# Patient Record
Sex: FEMALE | NOT HISPANIC OR LATINO | Employment: FULL TIME | URBAN - METROPOLITAN AREA
[De-identification: names, ages, dates, MRNs, and addresses within clinical notes are randomized per-mention and may not be internally consistent; named-entity substitution may affect disease eponyms.]

---

## 2021-01-27 ENCOUNTER — IMMUNIZATION (OUTPATIENT)
Dept: NURSING | Facility: CLINIC | Age: 26
End: 2021-01-27
Payer: COMMERCIAL

## 2021-01-27 PROCEDURE — 91300 PR COVID VAC PFIZER DIL RECON 30 MCG/0.3 ML IM: CPT

## 2021-01-27 PROCEDURE — 0001A PR COVID VAC PFIZER DIL RECON 30 MCG/0.3 ML IM: CPT

## 2021-02-17 ENCOUNTER — IMMUNIZATION (OUTPATIENT)
Dept: NURSING | Facility: CLINIC | Age: 26
End: 2021-02-17
Attending: FAMILY MEDICINE
Payer: COMMERCIAL

## 2021-02-17 PROCEDURE — 91300 PR COVID VAC PFIZER DIL RECON 30 MCG/0.3 ML IM: CPT

## 2021-02-17 PROCEDURE — 0002A PR COVID VAC PFIZER DIL RECON 30 MCG/0.3 ML IM: CPT

## 2021-03-07 ENCOUNTER — HEALTH MAINTENANCE LETTER (OUTPATIENT)
Age: 26
End: 2021-03-07

## 2021-10-11 ENCOUNTER — HEALTH MAINTENANCE LETTER (OUTPATIENT)
Age: 26
End: 2021-10-11

## 2022-03-27 ENCOUNTER — HEALTH MAINTENANCE LETTER (OUTPATIENT)
Age: 27
End: 2022-03-27

## 2022-09-25 ENCOUNTER — HEALTH MAINTENANCE LETTER (OUTPATIENT)
Age: 27
End: 2022-09-25

## 2022-12-28 ENCOUNTER — HOSPITAL ENCOUNTER (INPATIENT)
Facility: CLINIC | Age: 27
LOS: 3 days | Discharge: HOME | End: 2023-01-01
Attending: EMERGENCY MEDICINE | Admitting: HOSPITALIST
Payer: COMMERCIAL

## 2022-12-28 DIAGNOSIS — R11.10 VOMITING, UNSPECIFIED VOMITING TYPE, UNSPECIFIED WHETHER NAUSEA PRESENT: ICD-10-CM

## 2022-12-28 DIAGNOSIS — R11.0 NAUSEA: ICD-10-CM

## 2022-12-28 DIAGNOSIS — N10 PYELONEPHRITIS, ACUTE: Primary | ICD-10-CM

## 2022-12-28 DIAGNOSIS — R93.5 ABNORMAL CT OF THE ABDOMEN: ICD-10-CM

## 2022-12-28 DIAGNOSIS — R10.9 ACUTE ABDOMINAL PAIN: ICD-10-CM

## 2022-12-28 LAB
ALBUMIN SERPL-MCNC: 4 G/DL (ref 3.4–5)
ALBUMIN UR-MCNC: NEGATIVE MG/DL
ALP SERPL-CCNC: 65 U/L (ref 40–150)
ALT SERPL W P-5'-P-CCNC: 19 U/L (ref 0–50)
ANION GAP SERPL CALCULATED.3IONS-SCNC: 9 MMOL/L (ref 3–14)
APPEARANCE UR: CLEAR
AST SERPL W P-5'-P-CCNC: 16 U/L (ref 0–45)
BASOPHILS # BLD AUTO: 0 10E3/UL (ref 0–0.2)
BASOPHILS NFR BLD AUTO: 0 %
BILIRUB SERPL-MCNC: 1.3 MG/DL (ref 0.2–1.3)
BILIRUB UR QL STRIP: NEGATIVE
BUN SERPL-MCNC: 10 MG/DL (ref 7–30)
CALCIUM SERPL-MCNC: 9.4 MG/DL (ref 8.5–10.1)
CHLORIDE BLD-SCNC: 99 MMOL/L (ref 94–109)
CO2 SERPL-SCNC: 22 MMOL/L (ref 20–32)
COLOR UR AUTO: ABNORMAL
CREAT SERPL-MCNC: 0.66 MG/DL (ref 0.52–1.04)
EOSINOPHIL # BLD AUTO: 0 10E3/UL (ref 0–0.7)
EOSINOPHIL NFR BLD AUTO: 0 %
ERYTHROCYTE [DISTWIDTH] IN BLOOD BY AUTOMATED COUNT: 11.5 % (ref 10–15)
GFR SERPL CREATININE-BSD FRML MDRD: >90 ML/MIN/1.73M2
GLUCOSE BLD-MCNC: 100 MG/DL (ref 70–99)
GLUCOSE UR STRIP-MCNC: NEGATIVE MG/DL
HCG SERPL QL: NEGATIVE
HCT VFR BLD AUTO: 38.3 % (ref 35–47)
HGB BLD-MCNC: 13.1 G/DL (ref 11.7–15.7)
HGB UR QL STRIP: NEGATIVE
IMM GRANULOCYTES # BLD: 0.1 10E3/UL
IMM GRANULOCYTES NFR BLD: 1 %
KETONES UR STRIP-MCNC: 60 MG/DL
LACTATE SERPL-SCNC: 1.1 MMOL/L (ref 0.7–2)
LEUKOCYTE ESTERASE UR QL STRIP: NEGATIVE
LIPASE SERPL-CCNC: 77 U/L (ref 73–393)
LYMPHOCYTES # BLD AUTO: 1.2 10E3/UL (ref 0.8–5.3)
LYMPHOCYTES NFR BLD AUTO: 7 %
MCH RBC QN AUTO: 31.6 PG (ref 26.5–33)
MCHC RBC AUTO-ENTMCNC: 34.2 G/DL (ref 31.5–36.5)
MCV RBC AUTO: 93 FL (ref 78–100)
MONOCYTES # BLD AUTO: 1.5 10E3/UL (ref 0–1.3)
MONOCYTES NFR BLD AUTO: 9 %
NEUTROPHILS # BLD AUTO: 14.8 10E3/UL (ref 1.6–8.3)
NEUTROPHILS NFR BLD AUTO: 83 %
NITRATE UR QL: NEGATIVE
NRBC # BLD AUTO: 0 10E3/UL
NRBC BLD AUTO-RTO: 0 /100
PH UR STRIP: 7 [PH] (ref 5–7)
PLATELET # BLD AUTO: 248 10E3/UL (ref 150–450)
POTASSIUM BLD-SCNC: 3.4 MMOL/L (ref 3.4–5.3)
PROT SERPL-MCNC: 7.7 G/DL (ref 6.8–8.8)
RBC # BLD AUTO: 4.14 10E6/UL (ref 3.8–5.2)
SODIUM SERPL-SCNC: 130 MMOL/L (ref 133–144)
SP GR UR STRIP: 1.01 (ref 1–1.03)
UROBILINOGEN UR STRIP-MCNC: NORMAL MG/DL
WBC # BLD AUTO: 17.7 10E3/UL (ref 4–11)

## 2022-12-28 PROCEDURE — C9803 HOPD COVID-19 SPEC COLLECT: HCPCS

## 2022-12-28 PROCEDURE — 80053 COMPREHEN METABOLIC PANEL: CPT | Performed by: EMERGENCY MEDICINE

## 2022-12-28 PROCEDURE — 84145 PROCALCITONIN (PCT): CPT | Performed by: HOSPITALIST

## 2022-12-28 PROCEDURE — 96361 HYDRATE IV INFUSION ADD-ON: CPT

## 2022-12-28 PROCEDURE — 85025 COMPLETE CBC W/AUTO DIFF WBC: CPT | Performed by: EMERGENCY MEDICINE

## 2022-12-28 PROCEDURE — 83605 ASSAY OF LACTIC ACID: CPT | Performed by: EMERGENCY MEDICINE

## 2022-12-28 PROCEDURE — 84703 CHORIONIC GONADOTROPIN ASSAY: CPT | Performed by: EMERGENCY MEDICINE

## 2022-12-28 PROCEDURE — 83690 ASSAY OF LIPASE: CPT | Performed by: EMERGENCY MEDICINE

## 2022-12-28 PROCEDURE — 250N000011 HC RX IP 250 OP 636: Performed by: EMERGENCY MEDICINE

## 2022-12-28 PROCEDURE — 96374 THER/PROPH/DIAG INJ IV PUSH: CPT

## 2022-12-28 PROCEDURE — 81003 URINALYSIS AUTO W/O SCOPE: CPT | Performed by: EMERGENCY MEDICINE

## 2022-12-28 PROCEDURE — 99285 EMERGENCY DEPT VISIT HI MDM: CPT | Mod: 25

## 2022-12-28 PROCEDURE — 258N000003 HC RX IP 258 OP 636: Performed by: EMERGENCY MEDICINE

## 2022-12-28 PROCEDURE — 36415 COLL VENOUS BLD VENIPUNCTURE: CPT | Performed by: EMERGENCY MEDICINE

## 2022-12-28 RX ORDER — ONDANSETRON 2 MG/ML
4 INJECTION INTRAMUSCULAR; INTRAVENOUS ONCE
Status: COMPLETED | OUTPATIENT
Start: 2022-12-28 | End: 2022-12-28

## 2022-12-28 RX ADMIN — ONDANSETRON 4 MG: 2 INJECTION INTRAMUSCULAR; INTRAVENOUS at 21:42

## 2022-12-28 RX ADMIN — SODIUM CHLORIDE 1000 ML: 9 INJECTION, SOLUTION INTRAVENOUS at 21:42

## 2022-12-29 ENCOUNTER — APPOINTMENT (OUTPATIENT)
Dept: CT IMAGING | Facility: CLINIC | Age: 27
End: 2022-12-29
Attending: EMERGENCY MEDICINE
Payer: COMMERCIAL

## 2022-12-29 PROBLEM — R93.5 ABNORMAL CT OF THE ABDOMEN: Status: ACTIVE | Noted: 2022-12-29

## 2022-12-29 PROBLEM — R11.10 VOMITING, UNSPECIFIED VOMITING TYPE, UNSPECIFIED WHETHER NAUSEA PRESENT: Status: ACTIVE | Noted: 2022-12-29

## 2022-12-29 PROBLEM — R10.9 ACUTE ABDOMINAL PAIN: Status: ACTIVE | Noted: 2022-12-29

## 2022-12-29 LAB
ALBUMIN SERPL-MCNC: 3.2 G/DL (ref 3.4–5)
ALP SERPL-CCNC: 57 U/L (ref 40–150)
ALT SERPL W P-5'-P-CCNC: 16 U/L (ref 0–50)
ANION GAP SERPL CALCULATED.3IONS-SCNC: 7 MMOL/L (ref 3–14)
AST SERPL W P-5'-P-CCNC: 15 U/L (ref 0–45)
ATRIAL RATE - MUSE: 124 BPM
BASOPHILS # BLD AUTO: 0 10E3/UL (ref 0–0.2)
BASOPHILS NFR BLD AUTO: 0 %
BILIRUB SERPL-MCNC: 1.4 MG/DL (ref 0.2–1.3)
BUN SERPL-MCNC: 6 MG/DL (ref 7–30)
CALCIUM SERPL-MCNC: 8.1 MG/DL (ref 8.5–10.1)
CHLORIDE BLD-SCNC: 104 MMOL/L (ref 94–109)
CK SERPL-CCNC: 77 U/L (ref 30–225)
CO2 SERPL-SCNC: 22 MMOL/L (ref 20–32)
CREAT SERPL-MCNC: 0.67 MG/DL (ref 0.52–1.04)
CRP SERPL-MCNC: 65.9 MG/L (ref 0–8)
DIASTOLIC BLOOD PRESSURE - MUSE: NORMAL MMHG
EOSINOPHIL # BLD AUTO: 0 10E3/UL (ref 0–0.7)
EOSINOPHIL NFR BLD AUTO: 0 %
ERYTHROCYTE [DISTWIDTH] IN BLOOD BY AUTOMATED COUNT: 11.9 % (ref 10–15)
FLUAV RNA SPEC QL NAA+PROBE: NEGATIVE
FLUBV RNA RESP QL NAA+PROBE: NEGATIVE
GFR SERPL CREATININE-BSD FRML MDRD: >90 ML/MIN/1.73M2
GLUCOSE BLD-MCNC: 87 MG/DL (ref 70–99)
HCT VFR BLD AUTO: 36.6 % (ref 35–47)
HGB BLD-MCNC: 12.1 G/DL (ref 11.7–15.7)
IMM GRANULOCYTES # BLD: 0.1 10E3/UL
IMM GRANULOCYTES NFR BLD: 1 %
INTERPRETATION ECG - MUSE: NORMAL
LACTATE SERPL-SCNC: 1.6 MMOL/L (ref 0.7–2)
LYMPHOCYTES # BLD AUTO: 1.1 10E3/UL (ref 0.8–5.3)
LYMPHOCYTES NFR BLD AUTO: 6 %
MCH RBC QN AUTO: 31 PG (ref 26.5–33)
MCHC RBC AUTO-ENTMCNC: 33.1 G/DL (ref 31.5–36.5)
MCV RBC AUTO: 94 FL (ref 78–100)
MONOCYTES # BLD AUTO: 1.1 10E3/UL (ref 0–1.3)
MONOCYTES NFR BLD AUTO: 6 %
MONOCYTES NFR BLD AUTO: NEGATIVE %
NEUTROPHILS # BLD AUTO: 15.9 10E3/UL (ref 1.6–8.3)
NEUTROPHILS NFR BLD AUTO: 87 %
NRBC # BLD AUTO: 0 10E3/UL
NRBC BLD AUTO-RTO: 0 /100
P AXIS - MUSE: 51 DEGREES
PLATELET # BLD AUTO: 226 10E3/UL (ref 150–450)
POTASSIUM BLD-SCNC: 3.6 MMOL/L (ref 3.4–5.3)
PR INTERVAL - MUSE: 156 MS
PROCALCITONIN SERPL-MCNC: 0.07 NG/ML
PROCALCITONIN SERPL-MCNC: 0.22 NG/ML
PROT SERPL-MCNC: 6.5 G/DL (ref 6.8–8.8)
QRS DURATION - MUSE: 72 MS
QT - MUSE: 318 MS
QTC - MUSE: 456 MS
R AXIS - MUSE: 69 DEGREES
RBC # BLD AUTO: 3.9 10E6/UL (ref 3.8–5.2)
RSV RNA SPEC NAA+PROBE: NEGATIVE
SARS-COV-2 RNA RESP QL NAA+PROBE: NEGATIVE
SARS-COV-2 RNA RESP QL NAA+PROBE: NEGATIVE
SODIUM SERPL-SCNC: 133 MMOL/L (ref 133–144)
SYSTOLIC BLOOD PRESSURE - MUSE: NORMAL MMHG
T AXIS - MUSE: 13 DEGREES
VENTRICULAR RATE- MUSE: 124 BPM
WBC # BLD AUTO: 18.2 10E3/UL (ref 4–11)

## 2022-12-29 PROCEDURE — 82550 ASSAY OF CK (CPK): CPT | Performed by: HOSPITALIST

## 2022-12-29 PROCEDURE — G0378 HOSPITAL OBSERVATION PER HR: HCPCS

## 2022-12-29 PROCEDURE — 250N000013 HC RX MED GY IP 250 OP 250 PS 637: Performed by: HOSPITALIST

## 2022-12-29 PROCEDURE — 36415 COLL VENOUS BLD VENIPUNCTURE: CPT | Performed by: EMERGENCY MEDICINE

## 2022-12-29 PROCEDURE — 87637 SARSCOV2&INF A&B&RSV AMP PRB: CPT | Performed by: HOSPITALIST

## 2022-12-29 PROCEDURE — 250N000011 HC RX IP 250 OP 636: Performed by: EMERGENCY MEDICINE

## 2022-12-29 PROCEDURE — 96375 TX/PRO/DX INJ NEW DRUG ADDON: CPT

## 2022-12-29 PROCEDURE — 93005 ELECTROCARDIOGRAM TRACING: CPT

## 2022-12-29 PROCEDURE — 258N000003 HC RX IP 258 OP 636: Performed by: HOSPITALIST

## 2022-12-29 PROCEDURE — 36415 COLL VENOUS BLD VENIPUNCTURE: CPT | Performed by: HOSPITALIST

## 2022-12-29 PROCEDURE — 96376 TX/PRO/DX INJ SAME DRUG ADON: CPT

## 2022-12-29 PROCEDURE — 258N000003 HC RX IP 258 OP 636: Performed by: EMERGENCY MEDICINE

## 2022-12-29 PROCEDURE — 85025 COMPLETE CBC W/AUTO DIFF WBC: CPT | Performed by: HOSPITALIST

## 2022-12-29 PROCEDURE — 99222 1ST HOSP IP/OBS MODERATE 55: CPT | Performed by: HOSPITALIST

## 2022-12-29 PROCEDURE — 84145 PROCALCITONIN (PCT): CPT | Performed by: HOSPITALIST

## 2022-12-29 PROCEDURE — 82310 ASSAY OF CALCIUM: CPT | Performed by: HOSPITALIST

## 2022-12-29 PROCEDURE — 250N000009 HC RX 250: Performed by: EMERGENCY MEDICINE

## 2022-12-29 PROCEDURE — 86308 HETEROPHILE ANTIBODY SCREEN: CPT | Performed by: HOSPITALIST

## 2022-12-29 PROCEDURE — U0003 INFECTIOUS AGENT DETECTION BY NUCLEIC ACID (DNA OR RNA); SEVERE ACUTE RESPIRATORY SYNDROME CORONAVIRUS 2 (SARS-COV-2) (CORONAVIRUS DISEASE [COVID-19]), AMPLIFIED PROBE TECHNIQUE, MAKING USE OF HIGH THROUGHPUT TECHNOLOGIES AS DESCRIBED BY CMS-2020-01-R: HCPCS | Performed by: HOSPITALIST

## 2022-12-29 PROCEDURE — 250N000011 HC RX IP 250 OP 636: Performed by: HOSPITALIST

## 2022-12-29 PROCEDURE — 74177 CT ABD & PELVIS W/CONTRAST: CPT

## 2022-12-29 PROCEDURE — 96361 HYDRATE IV INFUSION ADD-ON: CPT

## 2022-12-29 PROCEDURE — 86140 C-REACTIVE PROTEIN: CPT | Performed by: HOSPITALIST

## 2022-12-29 PROCEDURE — 120N000001 HC R&B MED SURG/OB

## 2022-12-29 PROCEDURE — 96375 TX/PRO/DX INJ NEW DRUG ADDON: CPT | Mod: 59

## 2022-12-29 PROCEDURE — 83605 ASSAY OF LACTIC ACID: CPT | Performed by: HOSPITALIST

## 2022-12-29 PROCEDURE — 87040 BLOOD CULTURE FOR BACTERIA: CPT | Performed by: EMERGENCY MEDICINE

## 2022-12-29 RX ORDER — NALOXONE HYDROCHLORIDE 0.4 MG/ML
0.2 INJECTION, SOLUTION INTRAMUSCULAR; INTRAVENOUS; SUBCUTANEOUS
Status: DISCONTINUED | OUTPATIENT
Start: 2022-12-29 | End: 2023-01-01 | Stop reason: HOSPADM

## 2022-12-29 RX ORDER — METOCLOPRAMIDE HYDROCHLORIDE 5 MG/ML
5 INJECTION INTRAMUSCULAR; INTRAVENOUS ONCE
Status: COMPLETED | OUTPATIENT
Start: 2022-12-29 | End: 2022-12-29

## 2022-12-29 RX ORDER — HYDROMORPHONE HYDROCHLORIDE 1 MG/ML
0.5 INJECTION, SOLUTION INTRAMUSCULAR; INTRAVENOUS; SUBCUTANEOUS
Status: COMPLETED | OUTPATIENT
Start: 2022-12-29 | End: 2022-12-29

## 2022-12-29 RX ORDER — METOCLOPRAMIDE HYDROCHLORIDE 5 MG/ML
10 INJECTION INTRAMUSCULAR; INTRAVENOUS EVERY 6 HOURS PRN
Status: DISCONTINUED | OUTPATIENT
Start: 2022-12-29 | End: 2023-01-01 | Stop reason: HOSPADM

## 2022-12-29 RX ORDER — ONDANSETRON 2 MG/ML
4 INJECTION INTRAMUSCULAR; INTRAVENOUS
Status: COMPLETED | OUTPATIENT
Start: 2022-12-29 | End: 2022-12-29

## 2022-12-29 RX ORDER — CEFTRIAXONE 2 G/1
2 INJECTION, POWDER, FOR SOLUTION INTRAMUSCULAR; INTRAVENOUS EVERY 24 HOURS
Status: DISCONTINUED | OUTPATIENT
Start: 2022-12-29 | End: 2022-12-29

## 2022-12-29 RX ORDER — LANOLIN ALCOHOL/MO/W.PET/CERES
3 CREAM (GRAM) TOPICAL
Status: DISCONTINUED | OUTPATIENT
Start: 2022-12-29 | End: 2023-01-01 | Stop reason: HOSPADM

## 2022-12-29 RX ORDER — MEROPENEM 1 G/1
1 INJECTION, POWDER, FOR SOLUTION INTRAVENOUS EVERY 8 HOURS
Status: DISCONTINUED | OUTPATIENT
Start: 2022-12-29 | End: 2023-01-01

## 2022-12-29 RX ORDER — ONDANSETRON 4 MG/1
4 TABLET, ORALLY DISINTEGRATING ORAL EVERY 6 HOURS PRN
Status: DISCONTINUED | OUTPATIENT
Start: 2022-12-29 | End: 2023-01-01 | Stop reason: HOSPADM

## 2022-12-29 RX ORDER — MORPHINE SULFATE 2 MG/ML
1 INJECTION, SOLUTION INTRAMUSCULAR; INTRAVENOUS
Status: DISCONTINUED | OUTPATIENT
Start: 2022-12-29 | End: 2022-12-29

## 2022-12-29 RX ORDER — METOCLOPRAMIDE HYDROCHLORIDE 5 MG/ML
5 INJECTION INTRAMUSCULAR; INTRAVENOUS EVERY 6 HOURS PRN
Status: DISCONTINUED | OUTPATIENT
Start: 2022-12-29 | End: 2022-12-29

## 2022-12-29 RX ORDER — METOCLOPRAMIDE HYDROCHLORIDE 5 MG/ML
10 INJECTION INTRAMUSCULAR; INTRAVENOUS ONCE
Status: DISCONTINUED | OUTPATIENT
Start: 2022-12-29 | End: 2022-12-29

## 2022-12-29 RX ORDER — NALOXONE HYDROCHLORIDE 0.4 MG/ML
0.4 INJECTION, SOLUTION INTRAMUSCULAR; INTRAVENOUS; SUBCUTANEOUS
Status: DISCONTINUED | OUTPATIENT
Start: 2022-12-29 | End: 2023-01-01 | Stop reason: HOSPADM

## 2022-12-29 RX ORDER — ACETAMINOPHEN 325 MG/1
650 TABLET ORAL EVERY 6 HOURS PRN
Status: DISCONTINUED | OUTPATIENT
Start: 2022-12-29 | End: 2023-01-01 | Stop reason: HOSPADM

## 2022-12-29 RX ORDER — ASCORBIC ACID 500 MG
500 TABLET ORAL DAILY
COMMUNITY

## 2022-12-29 RX ORDER — HYDROMORPHONE HCL IN WATER/PF 6 MG/30 ML
0.2 PATIENT CONTROLLED ANALGESIA SYRINGE INTRAVENOUS EVERY 4 HOURS PRN
Status: DISCONTINUED | OUTPATIENT
Start: 2022-12-29 | End: 2023-01-01 | Stop reason: HOSPADM

## 2022-12-29 RX ORDER — PROCHLORPERAZINE 25 MG
25 SUPPOSITORY, RECTAL RECTAL EVERY 12 HOURS PRN
Status: DISCONTINUED | OUTPATIENT
Start: 2022-12-29 | End: 2023-01-01 | Stop reason: HOSPADM

## 2022-12-29 RX ORDER — IOPAMIDOL 755 MG/ML
78 INJECTION, SOLUTION INTRAVASCULAR ONCE
Status: COMPLETED | OUTPATIENT
Start: 2022-12-29 | End: 2022-12-29

## 2022-12-29 RX ORDER — METOCLOPRAMIDE HYDROCHLORIDE 5 MG/ML
10 INJECTION INTRAMUSCULAR; INTRAVENOUS EVERY 6 HOURS PRN
Status: DISCONTINUED | OUTPATIENT
Start: 2022-12-29 | End: 2022-12-29

## 2022-12-29 RX ORDER — POLYETHYLENE GLYCOL 3350 17 G/17G
17 POWDER, FOR SOLUTION ORAL DAILY PRN
Status: DISCONTINUED | OUTPATIENT
Start: 2022-12-29 | End: 2023-01-01 | Stop reason: HOSPADM

## 2022-12-29 RX ORDER — PROCHLORPERAZINE MALEATE 5 MG
10 TABLET ORAL EVERY 6 HOURS PRN
Status: DISCONTINUED | OUTPATIENT
Start: 2022-12-29 | End: 2023-01-01 | Stop reason: HOSPADM

## 2022-12-29 RX ORDER — SODIUM CHLORIDE 9 MG/ML
INJECTION, SOLUTION INTRAVENOUS CONTINUOUS
Status: DISCONTINUED | OUTPATIENT
Start: 2022-12-29 | End: 2022-12-31

## 2022-12-29 RX ORDER — BUPROPION HYDROCHLORIDE 150 MG/1
150 TABLET ORAL DAILY
COMMUNITY
Start: 2021-04-02

## 2022-12-29 RX ORDER — ONDANSETRON 2 MG/ML
4 INJECTION INTRAMUSCULAR; INTRAVENOUS EVERY 6 HOURS PRN
Status: DISCONTINUED | OUTPATIENT
Start: 2022-12-29 | End: 2023-01-01 | Stop reason: HOSPADM

## 2022-12-29 RX ADMIN — ONDANSETRON 4 MG: 2 INJECTION INTRAMUSCULAR; INTRAVENOUS at 08:00

## 2022-12-29 RX ADMIN — HYDROMORPHONE HYDROCHLORIDE 0.5 MG: 1 INJECTION, SOLUTION INTRAMUSCULAR; INTRAVENOUS; SUBCUTANEOUS at 00:50

## 2022-12-29 RX ADMIN — SODIUM CHLORIDE: 9 INJECTION, SOLUTION INTRAVENOUS at 13:42

## 2022-12-29 RX ADMIN — METOCLOPRAMIDE 5 MG: 5 INJECTION, SOLUTION INTRAMUSCULAR; INTRAVENOUS at 03:35

## 2022-12-29 RX ADMIN — ACETAMINOPHEN 650 MG: 325 TABLET, FILM COATED ORAL at 17:22

## 2022-12-29 RX ADMIN — METOCLOPRAMIDE 5 MG: 5 INJECTION, SOLUTION INTRAMUSCULAR; INTRAVENOUS at 02:31

## 2022-12-29 RX ADMIN — SODIUM CHLORIDE: 9 INJECTION, SOLUTION INTRAVENOUS at 06:58

## 2022-12-29 RX ADMIN — MELATONIN TAB 3 MG 3 MG: 3 TAB at 21:34

## 2022-12-29 RX ADMIN — MEROPENEM 1 G: 1 INJECTION, POWDER, FOR SOLUTION INTRAVENOUS at 21:34

## 2022-12-29 RX ADMIN — PROCHLORPERAZINE EDISYLATE 10 MG: 5 INJECTION INTRAMUSCULAR; INTRAVENOUS at 16:12

## 2022-12-29 RX ADMIN — HYDROMORPHONE HYDROCHLORIDE 0.5 MG: 1 INJECTION, SOLUTION INTRAMUSCULAR; INTRAVENOUS; SUBCUTANEOUS at 02:15

## 2022-12-29 RX ADMIN — HYDROMORPHONE HYDROCHLORIDE 0.2 MG: 0.2 INJECTION, SOLUTION INTRAMUSCULAR; INTRAVENOUS; SUBCUTANEOUS at 13:33

## 2022-12-29 RX ADMIN — ONDANSETRON 4 MG: 2 INJECTION INTRAMUSCULAR; INTRAVENOUS at 00:49

## 2022-12-29 RX ADMIN — SODIUM CHLORIDE 1000 ML: 9 INJECTION, SOLUTION INTRAVENOUS at 05:09

## 2022-12-29 RX ADMIN — PROCHLORPERAZINE EDISYLATE 10 MG: 5 INJECTION INTRAMUSCULAR; INTRAVENOUS at 08:48

## 2022-12-29 RX ADMIN — MEROPENEM 1 G: 1 INJECTION, POWDER, FOR SOLUTION INTRAVENOUS at 13:42

## 2022-12-29 RX ADMIN — MORPHINE SULFATE 1 MG: 2 INJECTION, SOLUTION INTRAMUSCULAR; INTRAVENOUS at 09:09

## 2022-12-29 RX ADMIN — CEFTRIAXONE SODIUM 2 G: 2 INJECTION, POWDER, FOR SOLUTION INTRAMUSCULAR; INTRAVENOUS at 06:58

## 2022-12-29 RX ADMIN — METOCLOPRAMIDE HYDROCHLORIDE 10 MG: 5 INJECTION INTRAMUSCULAR; INTRAVENOUS at 13:47

## 2022-12-29 RX ADMIN — METOCLOPRAMIDE HYDROCHLORIDE 10 MG: 5 INJECTION INTRAMUSCULAR; INTRAVENOUS at 19:31

## 2022-12-29 RX ADMIN — IOPAMIDOL 78 ML: 755 INJECTION, SOLUTION INTRAVENOUS at 01:44

## 2022-12-29 RX ADMIN — SODIUM CHLORIDE 62 ML: 9 INJECTION, SOLUTION INTRAVENOUS at 01:44

## 2022-12-29 RX ADMIN — SODIUM CHLORIDE 1000 ML: 9 INJECTION, SOLUTION INTRAVENOUS at 16:10

## 2022-12-29 RX ADMIN — SODIUM CHLORIDE 1000 ML: 9 INJECTION, SOLUTION INTRAVENOUS at 00:49

## 2022-12-29 RX ADMIN — ONDANSETRON 4 MG: 2 INJECTION INTRAMUSCULAR; INTRAVENOUS at 02:15

## 2022-12-29 ASSESSMENT — ACTIVITIES OF DAILY LIVING (ADL)
ADLS_ACUITY_SCORE: 37
ADLS_ACUITY_SCORE: 35
ADLS_ACUITY_SCORE: 37
ADLS_ACUITY_SCORE: 35
ADLS_ACUITY_SCORE: 35
ADLS_ACUITY_SCORE: 37

## 2022-12-29 NOTE — PROGRESS NOTES
Patient admitted from ED due to Abdominal pain, nausea and vomiting. Patient is A&O x 4. VSS on RA except for elevated temp at 100 . Clear liquid diet. On continuous NS IV at 150 ml/hr. Patient is settled in bed. No skin concern noted or reported.

## 2022-12-29 NOTE — ED NOTES
Coronary artery disease with remote history of PCI.  Her most recent stress test suggested attenuation artifact, she does not have any angina and will continue on medical therapy.   [facility]  ED Nurse Handoff Report    ED Chief complaint: Abdominal Pain      ED Diagnosis:   Final diagnoses:   Acute abdominal pain   Abnormal CT of the abdomen   Vomiting, unspecified vomiting type, unspecified whether nausea present       Code Status: Full Code    Allergies: No Known Allergies    Patient Story: Pt complains of sharp intermittent left sided abdominal pain with nausea and vomiting that started at 1700 yesterday.     Focused Assessment:    Labs Ordered and Resulted from Time of ED Arrival to Time of ED Departure   COMPREHENSIVE METABOLIC PANEL - Abnormal       Result Value    Sodium 130 (*)     Potassium 3.4      Chloride 99      Carbon Dioxide (CO2) 22      Anion Gap 9      Urea Nitrogen 10      Creatinine 0.66      Calcium 9.4      Glucose 100 (*)     Alkaline Phosphatase 65      AST 16      ALT 19      Protein Total 7.7      Albumin 4.0      Bilirubin Total 1.3      GFR Estimate >90     UA MACROSCOPIC WITH REFLEX TO MICRO AND CULTURE - Abnormal    Color Urine Light Yellow      Appearance Urine Clear      Glucose Urine Negative      Bilirubin Urine Negative      Ketones Urine 60 (*)     Specific Gravity Urine 1.011      Blood Urine Negative      pH Urine 7.0      Protein Albumin Urine Negative      Urobilinogen Urine Normal      Nitrite Urine Negative      Leukocyte Esterase Urine Negative     CBC WITH PLATELETS AND DIFFERENTIAL - Abnormal    WBC Count 17.7 (*)     RBC Count 4.14      Hemoglobin 13.1      Hematocrit 38.3      MCV 93      MCH 31.6      MCHC 34.2      RDW 11.5      Platelet Count 248      % Neutrophils 83      % Lymphocytes 7      % Monocytes 9      % Eosinophils 0      % Basophils 0      % Immature Granulocytes 1      NRBCs per 100 WBC 0      Absolute Neutrophils 14.8 (*)     Absolute Lymphocytes 1.2      Absolute Monocytes 1.5 (*)     Absolute Eosinophils 0.0      Absolute Basophils 0.0      Absolute Immature Granulocytes 0.1      Absolute NRBCs 0.0     LIPASE -  Normal    Lipase 77     HCG QUALITATIVE PREGNANCY - Normal    hCG Serum Qualitative Negative     LACTIC ACID WHOLE BLOOD - Normal    Lactic Acid 1.1     BLOOD CULTURE   BLOOD CULTURE     CT Abdomen Pelvis w Contrast   Final Result   IMPRESSION:    1.  Possible left pyelonephritis. No abscess or hydronephrosis.   2.  No other acute abnormality.            Treatments and/or interventions provided: IV 18G right AC. Zofran x2, Reglan, Dilaudid 0.5mg x2   Patient's response to treatments and/or interventions: Tolerated      To be done/followed up on inpatient unit:  Pain control/nausea meds, cardiac monitor     Does this patient have any cognitive concerns?: none    Activity level - Baseline/Home:  Independent  Activity Level - Current:   Stand with Assist    Patient's Preferred language: English   Needed?: No    Isolation: None  Infection: Not Applicable  Patient tested for COVID 19 prior to admission: NO  Bariatric?: No    Vital Signs:   Vitals:    12/29/22 0217 12/29/22 0224 12/29/22 0225 12/29/22 0302   BP:       Pulse: (!) 133 114 112    Resp: 26 14 12    Temp:    99.4  F (37.4  C)   TempSrc:    Oral   SpO2: 99% 95% 97%    Weight:           Cardiac Rhythm:  Sinus Tachycardia    Was the PSS-3 completed:   Yes  What interventions are required if any?               Family Comments: None  OBS brochure/video discussed/provided to patient/family: No              For the majority of the shift this patient's behavior was Green.   Behavioral interventions performed were None .    ED NURSE PHONE NUMBER: [phone]     '

## 2022-12-29 NOTE — PROGRESS NOTES
Shift Summary [id number]    Admitting Diagnosis: Acute abdominal pain [R10.9]  Abnormal CT of the abdomen [R93.5]  Vomiting, unspecified vomiting type, unspecified whether nausea present [R11.10]   Vitals : tachycardic, fever, Sepsis protocol activated during shift.   Pain 5-7/10. Taking IV Dilaudid.  PRN.   A&Ox4  Voiding WNL.   Mobility ; stand by assist.   Tele NSR.   CMS wnl.   Lung Sounds clear on room air.  Room air greater than 95%.   GI ; Left quadrant abdominal pain.   Dressing PIV site CDI.     Orders Placed This Encounter      Advance Diet as Tolerated: Clear Liquid Diet       Plan:     Work up on abdominal pain ongoing.

## 2022-12-29 NOTE — PHARMACY-ADMISSION MEDICATION HISTORY
Pharmacy Medication History  Admission medication history interview status for the 12/28/2022  admission is complete. See EPIC admission navigator for prior to admission medications     Location of Interview: Phone  Medication history sources: Patient and Surescripts    Significant changes made to the medication list:  1. Added all meds to list    In the past week, patient estimated taking medication this percent of the time: greater than 90%    Additional medication history information:   1. Patient reports she is no longer taking Eluring or Debacterol.   2. Patient takes OTC vitamins. She did not know the strength of Vitamin B12 or D3.     Medication reconciliation completed by provider prior to medication history? No    Time spent in this activity: 10 min    Prior to Admission medications    Medication Sig Last Dose Taking? Auth Provider Long Term End Date   buPROPion (WELLBUTRIN XL) 150 MG 24 hr tablet Take 150 mg by mouth daily 12/28/2022 at AM Yes Unknown, Entered By History Yes    CHOLECALCIFEROL PO Take 1 tablet by mouth daily (Strength unknown) 12/28/2022 at AM Yes Unknown, Entered By History     Cyanocobalamin (VITAMIN B-12 PO) Take 1 tablet by mouth daily (Strength unknown) 12/28/2022 at AM Yes Unknown, Entered By History     vitamin C (ASCORBIC ACID) 500 MG tablet Take 500 mg by mouth daily 12/28/2022 at AM Yes Unknown, Entered By History         The information provided in this note is only as accurate as the sources available at the time of update(s)

## 2022-12-29 NOTE — ED TRIAGE NOTES
Pt complains of sharp intermittent left sided abdominal pain with nausea and vomiting that started at 1700 yesterday.        Triage Assessment     Row Name 12/28/22 2127       Triage Assessment (Adult)    Airway WDL WDL       Respiratory WDL    Respiratory WDL WDL       Skin Circulation/Temperature WDL    Skin Circulation/Temperature WDL WDL       Cardiac WDL    Cardiac WDL WDL       Peripheral/Neurovascular WDL    Peripheral Neurovascular WDL WDL       Cognitive/Neuro/Behavioral WDL    Cognitive/Neuro/Behavioral WDL WDL

## 2022-12-29 NOTE — PROGRESS NOTES
RECEIVING UNIT ED HANDOFF REVIEW    ED Nurse Handoff Report was reviewed by: Tara Orosco RN on December 29, 2022 at 4:24 AM

## 2022-12-29 NOTE — PROGRESS NOTES
Sepsis protocol activated. Lactic acid WNL. Fever persist. Patient also remains tachycardic. Provider  Notified.

## 2022-12-29 NOTE — UTILIZATION REVIEW
"Admission Status; Secondary Review Determination     Admission Date: 12/28/2022 11:49 PM       Under the authority of the Utilization Management Committee, the utilization review process indicated a secondary review on the above patient.  The review outcome is based on review of the medical records, discussions with staff, and applying clinical experience noted on the date of the review.          (x) Observation Status Appropriate - This patient does not meet hospital inpatient criteria and is placed in observation status. If this patient's primary payer is Medicare and was admitted as an inpatient, Condition Code 44 should be used and patient status changed to \"observation\".       RATIONALE FOR DETERMINATION      Brief clinical presentation, information copied from the chart, abbreviated and edited for relevant content:     If patient unable to discharge by tomorrow, consider another review for possible inpatient.       Mya Vidal is a 27 year old woman without significant past medical history who presented with fever, nausea, vomiting, and abdominal pain, and is found to have SIRS, possibly due to acute left sided pyelonephritis. Since admission, she has had a fever over 101, ongoing tachycardia, ongoing leukocytosis with WBC over 18.  Procal elevated 0.22. CRP 66. CT suggests possible early acute left sided pyelonephritis; though UA initially negative. viral causes of SIRS so far negative. Started on Empiric Ceftriaxone ordered. Follow up blood cultures. On IVF.       The severity of illness, intensity of cares provided, risk for adverse outcome, and expected LOS make the care appropriate for observation.       The information on this document is developed by the utilization review team in order for the business office to ensure compliance.  This only denotes the appropriateness of proper admission status and does not reflect the quality of care rendered.         The definitions of Inpatient Status and " Observation Status used in making the determination above are those provided in the CMS Coverage Manual, Chapter 1 and Chapter 6, section 70.4.      Sincerely,     Felicita Cruz MD   Utilization Review/ Case Management  [facility].

## 2022-12-29 NOTE — H&P
[facility]    History and Physical  Hospitalist       Date of Admission:  12/28/2022  Date of Service (when I saw the patient): 12/29/22    ASSESSMENT  Mya Vidal is a markedly pleasant 27 year old woman without significant past medical history who presents with fever, nausea, vomiting, and abdominal pain, and is found to have SIRS, possibly due to acute left sided pyelonephritis.    PLAN    SIRS, possibly due to acute left sided pyelonephritis: Ms. Vidal presents with acute fever, nausea, vomiting, and abdominal pain for the past 24 hours. In the Ed she is currently afebrile. She has tachycardia, ill appearance, and concomitant leukocytosis. CT suggests possible early acute left sided pyelonephritis; though this could be less likely given negative UA. We will evaluate for viral causes of SIRS.    -- Observation. ADAT. Empiric Ceftriaxone ordered. Follow up blood cultures. Check Pro calcitonin.    -- 150 ml/hour NS IVF ordered    -- COVID, Influenza, RSV and Monospot testing ordered    Chronic anxiety: Resume home medications when verified.    Rule Out COVID-19 infection  This patient was evaluated during a global COVID-19 pandemic, which necessitated consideration that the patient might be at risk for infection with the SARS-CoV-2 virus that causes COVID-19. Applicable protocols for evaluation were followed during the patient's care.   -follow up COVID-19 PCR test result  -no current indication for precautions    Chief Complaint   Abdominal pain    History is obtained from the patient and the ED physician whom I have spoken with    History of Present Illness   Mya Vidal is a markedly pleasant 27 year old woman who presents with sudden onset of abdominal pain last night, while in the bath, about 24 hours ago now; sharp left sided abdominal pain, associated with nausea, vomiting and fever. The symptoms resolved overnight but recurred today after she got home from work and  have been much more severe, with vomiting multiple episodes, feeling fever (last night and again tonight), new radiation of the pain to the right side of the belly, and numbness in her extremities, causing her to come in. She says she has never had these symptoms in the past. Dilaudid, Reglan, and Zofran were given in the ED with partial relief. She says she had the flu at the end of 11/2022 but otherwise since then has not been sick until now, with no recent cough, cold, runny nose, sore throat, or diarrhea, or rash; I particular she also denies dysuria, hematuria, vaginal discharge, or recent abnormal periods, or any recent sick contacts. She is not currently sexually active and has had no recent alcohol and never used recreational drugs. She otherwise denies any other acute complaints.    In the ED,   12/28 2127 109/68 98.6  F (37  C) 128  18 100 %     CBC and CMP were notable for WBC 17.7, Na 130, Glucose 100, otherwise were within the normal reference range. Lactate was 1.1. HCG negative. Blood cultures were sent. UA was negative. EKG showed sinus tachycardia.    She was also given IV fluid in the ED.    Recent Results (from the past 24 hour(s))   CT Abdomen Pelvis w Contrast    Narrative    EXAM: CT ABDOMEN PELVIS W CONTRAST  LOCATION: [facility]  DATE/TIME: 12/29/2022 1:55 AM    INDICATION: acute abd pain, fever, vomiting; no prior surgery  COMPARISON: None.  TECHNIQUE: CT scan of the abdomen and pelvis was performed following injection of IV contrast. Multiplanar reformats were obtained. Dose reduction techniques were used.  CONTRAST: 78mL Isovue 370    FINDINGS:   LOWER CHEST: Normal.    HEPATOBILIARY: Normal.    PANCREAS: Normal.    SPLEEN: Normal.    ADRENAL GLANDS: Normal.    KIDNEYS/BLADDER: There is an area of decreased enhancement in the lower pole of the left kidney. The kidneys otherwise appear normal. No hydronephrosis.    BOWEL: There is no bowel obstruction or  inflammation. The appendix is normal. No free intraperitoneal gas or fluid.    LYMPH NODES: Normal.    VASCULATURE: Unremarkable.    PELVIC ORGANS: 1.9 cm right adnexal cyst, within normal limits.    MUSCULOSKELETAL: Normal.      Impression    IMPRESSION:   1.  Possible left pyelonephritis. No abscess or hydronephrosis.  2.  No other acute abnormality.       PHYSICAL EXAM  Blood pressure 121/81, pulse 112, temperature 99.8  F (37.7  C), temperature source Oral, resp. rate 12, weight 70.3 kg (155 lb), SpO2 97 %.  Constitutional: Alert and oriented to person, place and time; appears ill  HEENT: normocephalic dry mucus membranes  Respiratory: lungs clear to auscultation bilaterally  Cardiovascular: regular S1 S2  GI: abdomen soft mildly tender in LUQ and LLQ, non distended bowel sounds positive  Lymph/Hematologic: pale, no cervical lymphadenopathy  Skin: no rash, good turgor  Musculoskeletal: no clubbing, cyanosis or edema  Neurologic: extra-ocular muscles intact; moves all four extremities  Psychiatric: appropriate affect, insight and judgment     DVT Prophylaxis: Pneumatic Compression Devices  Code Status: Full Code    Disposition: Expected discharge in 0-3 days pending resolution of symptoms    Rich Guerin MD, MD    Past Medical History    I have reviewed this patient's medical history and updated it with pertinent information if needed.   Past Medical History:   Diagnosis Date     Anxiety      Dysmenorrhea        Past Surgical History   I have reviewed this patient's surgical history and updated it with pertinent information if needed.  No past surgical history on file.    Prior to Admission Medications     Need to be reconciled but include:    1) Wellbutrin  2) Estrogen     Among possible others    Allergies   No Known Allergies    Social History   I have reviewed this patient's social history and updated it with pertinent information if needed. Mya Vidal  reports that she has never smoked. She  has never used smokeless tobacco. She reports current alcohol use.    Family History   Family history assessed and, except as above, is non-contributory.    Family History   Problem Relation Age of Onset     Hypertension Father        Review of Systems   The 10 point Review of Systems is negative other than noted in the HPI or here.     Primary Care Physician   Physician No Ref-Primary    Data   Labs Ordered and Resulted from Time of ED Arrival to Time of ED Departure   COMPREHENSIVE METABOLIC PANEL - Abnormal       Result Value    Sodium 130 (*)     Potassium 3.4      Chloride 99      Carbon Dioxide (CO2) 22      Anion Gap 9      Urea Nitrogen 10      Creatinine 0.66      Calcium 9.4      Glucose 100 (*)     Alkaline Phosphatase 65      AST 16      ALT 19      Protein Total 7.7      Albumin 4.0      Bilirubin Total 1.3      GFR Estimate >90     UA MACROSCOPIC WITH REFLEX TO MICRO AND CULTURE - Abnormal    Color Urine Light Yellow      Appearance Urine Clear      Glucose Urine Negative      Bilirubin Urine Negative      Ketones Urine 60 (*)     Specific Gravity Urine 1.011      Blood Urine Negative      pH Urine 7.0      Protein Albumin Urine Negative      Urobilinogen Urine Normal      Nitrite Urine Negative      Leukocyte Esterase Urine Negative     CBC WITH PLATELETS AND DIFFERENTIAL - Abnormal    WBC Count 17.7 (*)     RBC Count 4.14      Hemoglobin 13.1      Hematocrit 38.3      MCV 93      MCH 31.6      MCHC 34.2      RDW 11.5      Platelet Count 248      % Neutrophils 83      % Lymphocytes 7      % Monocytes 9      % Eosinophils 0      % Basophils 0      % Immature Granulocytes 1      NRBCs per 100 WBC 0      Absolute Neutrophils 14.8 (*)     Absolute Lymphocytes 1.2      Absolute Monocytes 1.5 (*)     Absolute Eosinophils 0.0      Absolute Basophils 0.0      Absolute Immature Granulocytes 0.1      Absolute NRBCs 0.0     LIPASE - Normal    Lipase 77     HCG QUALITATIVE PREGNANCY - Normal    hCG Serum  Qualitative Negative     LACTIC ACID WHOLE BLOOD - Normal    Lactic Acid 1.1     BLOOD CULTURE   BLOOD CULTURE       Data reviewed today:  I personally reviewed the EKG tracing showing sinus tachycardia and the abdominal CT image(s) showing possible left sided pyelonephritis.

## 2022-12-29 NOTE — PROVIDER NOTIFICATION
Pt c/o nausea, had 10  Mg reglan at 1347. Dose reduced to 5  Mg. Gave compazine  Since too soon to have reglan but this was not effective earlier. Pt also running fever with symptoms of chills and aches, requested order for tylenol.     Changed reglan order back, gave tylenol, updated vitals. Pt to move to more appropriate floor as well; provider requested 6.

## 2022-12-29 NOTE — PROGRESS NOTES
Patient admitted earlier today for N/V/ abdominal pain.  CT abdomen with possible pyelonephritis but UA negative. On empiric ceftriaxone, will broaden to meropenem as patient appears to be clinically worsening.  Continue abx, supportive care with  IVF and pain control for now. Will give 1L NSS bolus for hypotension. Other management as per H&P    No charge,    Aimee Camarillo

## 2022-12-29 NOTE — PROGRESS NOTES
Persistent Nausea even after receiving Zofran and Compazine. Provider notified for next plan of action. Patient is also tachycardic. NSR.

## 2022-12-29 NOTE — ED PROVIDER NOTES
History   Chief Complaint:  Abdominal pain    HPI   History supplemented by electronic chart review    Mya Vidal is a 27 year old female who presents with her aunt and grandmother for evaluation of abdominal pain that started around 4:30 in the afternoon on December 27, was quite intense for a number of hours and then subsided though did not completely resolve, waxing and waning since then, associated with nonbloody vomiting as well as fevers up to 102.  She took some ibuprofen earlier this morning but none more recently.  No prior abdominal surgeries.  Her last menstrual period was about 2 weeks ago, normal for her.  She had a normal nonbloody and formed bowel movement several hours ago.  No vaginal discharge or recent vaginal bleeding since her period.  No prior similar episodes.  Today the discomfort is bilateral.    Review of Systems  All other systems reviewed and negative except as above in HPI.    Allergies:  No Known Allergies     Medications:    Ibuprofen prn    Past Medical History:    No chronic conditions    Past Surgical History:    No past surgical history on file.     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    Physical Exam     Patient Vitals for the past 24 hrs:   BP Temp Temp src Pulse Resp SpO2 Weight   12/29/22 0546 (!) 86/56 100  F (37.8  C) Oral 117 18 98 % --   12/29/22 0500 92/52 -- -- 111 19 95 % --   12/29/22 0430 -- -- -- 114 21 95 % --   12/29/22 0411 100/58 -- -- 113 19 94 % --   12/29/22 0302 -- 99.4  F (37.4  C) Oral -- -- -- --   12/29/22 0225 -- -- -- 112 12 97 % --   12/29/22 0224 -- -- -- 114 14 95 % --   12/29/22 0217 -- -- -- (!) 133 26 99 % --   12/29/22 0203 121/81 -- -- -- -- -- --   12/29/22 0034 118/77 99.8  F (37.7  C) Oral (!) 125 18 100 % --   12/28/22 2127 109/68 98.6  F (37  C) Oral (!) 128 18 100 % 70.3 kg (155 lb)      Physical Exam  General: Uncomfortable  appearing woman recumbent in room 28, aunt and grandmother bedside  HENT: mucous membranes slightly dry  CV: rate as above, no LE edema  Resp: normal effort, speaks in full phrases, no stridor, no cough observed  GI: Abdomen soft, moderate tenderness primarily on the left side though somewhat diffuse, no guarding, no distention, no discrete masses, negative Sotomayor sign, no focal tenderness over McBurney's point  MSK: no bony tenderness, no CVAT  Skin: appropriately warm and dry, no abdominal rash  Neuro: alert, clear speech, oriented   Psych: cooperative    Emergency Department Course   Electrocardiogram  ECG taken at 0052, ECG interpreted at 0104 by SEJAL Leung MD  Sinus rhythm, nonspecific ST and T wave abnormality not suspicious for ischemia  Rate 124 bpm. ND interval 156. QRS duration 72. QTc 456      Imaging:    CT Abdomen Pelvis w Contrast   Final Result   IMPRESSION:    1.  Possible left pyelonephritis. No abscess or hydronephrosis.   2.  No other acute abnormality.           Laboratory:  Labs Ordered and Resulted from Time of ED Arrival to Time of ED Departure   COMPREHENSIVE METABOLIC PANEL - Abnormal       Result Value    Sodium 130 (*)     Potassium 3.4      Chloride 99      Carbon Dioxide (CO2) 22      Anion Gap 9      Urea Nitrogen 10      Creatinine 0.66      Calcium 9.4      Glucose 100 (*)     Alkaline Phosphatase 65      AST 16      ALT 19      Protein Total 7.7      Albumin 4.0      Bilirubin Total 1.3      GFR Estimate >90     UA MACROSCOPIC WITH REFLEX TO MICRO AND CULTURE - Abnormal    Color Urine Light Yellow      Appearance Urine Clear      Glucose Urine Negative      Bilirubin Urine Negative      Ketones Urine 60 (*)     Specific Gravity Urine 1.011      Blood Urine Negative      pH Urine 7.0      Protein Albumin Urine Negative      Urobilinogen Urine Normal      Nitrite Urine Negative      Leukocyte Esterase Urine Negative     CBC WITH PLATELETS AND DIFFERENTIAL - Abnormal    WBC Count 17.7  (*)     RBC Count 4.14      Hemoglobin 13.1      Hematocrit 38.3      MCV 93      MCH 31.6      MCHC 34.2      RDW 11.5      Platelet Count 248      % Neutrophils 83      % Lymphocytes 7      % Monocytes 9      % Eosinophils 0      % Basophils 0      % Immature Granulocytes 1      NRBCs per 100 WBC 0      Absolute Neutrophils 14.8 (*)     Absolute Lymphocytes 1.2      Absolute Monocytes 1.5 (*)     Absolute Eosinophils 0.0      Absolute Basophils 0.0      Absolute Immature Granulocytes 0.1      Absolute NRBCs 0.0     LIPASE - Normal    Lipase 77     HCG QUALITATIVE PREGNANCY - Normal    hCG Serum Qualitative Negative     LACTIC ACID WHOLE BLOOD - Normal    Lactic Acid 1.1     COVID-19 VIRUS (CORONAVIRUS) BY PCR - Normal    SARS CoV2 PCR Negative     BLOOD CULTURE   BLOOD CULTURE      Emergency Department Course:  Reviewed:  I reviewed nursing notes, vitals, and past medical history    Assessments:  I obtained history and examined the patient as noted above.     ED Course as of 12/29/22 0718   Thu Dec 29, 2022   0226 I rechecked pt in room 28, HR 110s, still has nausea, discussed test results, requested Hospitalist.   0255 I spoke with Dr. Guerin, Hospitalist, who accepts care.       Consults:   See above in ED Course    Interventions:  Medications   melatonin tablet 3 mg (has no administration in time range)   ondansetron (ZOFRAN ODT) ODT tab 4 mg (has no administration in time range)     Or   ondansetron (ZOFRAN) injection 4 mg (has no administration in time range)   sodium chloride 0.9% infusion ( Intravenous New Bag 12/29/22 0658)   cefTRIAXone (ROCEPHIN) 2 g vial to attach to  ml bag for ADULTS or NS 50 ml bag for PEDS (2 g Intravenous New Bag 12/29/22 0658)   polyethylene glycol (MIRALAX) Packet 17 g (has no administration in time range)   prochlorperazine (COMPAZINE) injection 10 mg (has no administration in time range)     Or   prochlorperazine (COMPAZINE) tablet 10 mg (has no administration in time  range)     Or   prochlorperazine (COMPAZINE) suppository 25 mg (has no administration in time range)   0.9% sodium chloride BOLUS (0 mLs Intravenous Stopped 12/28/22 2242)   ondansetron (ZOFRAN) injection 4 mg (4 mg Intravenous Given 12/28/22 2142)   0.9% sodium chloride BOLUS (0 mLs Intravenous Stopped 12/29/22 0130)   HYDROmorphone (PF) (DILAUDID) injection 0.5 mg (0.5 mg Intravenous Given 12/29/22 0215)   ondansetron (ZOFRAN) injection 4 mg (4 mg Intravenous Given 12/29/22 0215)   iopamidol (ISOVUE-370) solution 78 mL (78 mLs Intravenous Given 12/29/22 0144)   Saline Flush (62 mLs Intravenous Given 12/29/22 0144)   metoclopramide (REGLAN) injection 5 mg (5 mg Intravenous Given 12/29/22 0231)   metoclopramide (REGLAN) injection 5 mg (5 mg Intravenous Given 12/29/22 0335)   0.9% sodium chloride BOLUS (1,000 mLs Intravenous New Bag 12/29/22 0509)      Disposition:  Admit to Dr. Guerin    Impression & Plan    Medical Decision Making:  Differential diagnosis initially included renal colic from ureteral stone, ovarian pathology, diverticulitis, perforated viscus, pyelonephritis, pelvic inflammatory disease and others.  She has radiographic abnormality of the left kidney on CT, though interestingly her urinalysis is benign.  She has SIRS criteria with fever and tachycardia as well as leukocytosis though a specific bacterial infection is not absolute confirmed at this time.  She has no vaginal discharge, no focal pelvic tenderness, no worrisome radiographic features in the pelvis to raise higher suspicion for an acute gynecologic infection.  She is not pregnant.  She was treated symptomatically and had improvement in her tachycardia though remains with some discomfort and agreed with my recommendation for hospitalization for close monitoring and further care.  Given that her urinalysis is reassuring, the accepting hospitalist and I agreed to defer immediate antibiotics while awaiting blood cultures and further work-up  as part of her hospitalization.    Diagnosis:    ICD-10-CM    1. Acute abdominal pain  R10.9       2. Abnormal CT of the abdomen  R93.5       3. Vomiting, unspecified vomiting type, unspecified whether nausea present  R11.10          12/29/2022   MD Madhu Snow, Lon Quinn MD  12/29/22 0721

## 2022-12-30 ENCOUNTER — APPOINTMENT (OUTPATIENT)
Dept: GENERAL RADIOLOGY | Facility: CLINIC | Age: 27
End: 2022-12-30
Attending: HOSPITALIST
Payer: COMMERCIAL

## 2022-12-30 LAB
ALBUMIN SERPL-MCNC: 3.1 G/DL (ref 3.4–5)
ALP SERPL-CCNC: 52 U/L (ref 40–150)
ALT SERPL W P-5'-P-CCNC: 16 U/L (ref 0–50)
ANION GAP SERPL CALCULATED.3IONS-SCNC: 7 MMOL/L (ref 3–14)
AST SERPL W P-5'-P-CCNC: 14 U/L (ref 0–45)
BASOPHILS # BLD AUTO: 0 10E3/UL (ref 0–0.2)
BASOPHILS NFR BLD AUTO: 0 %
BILIRUB SERPL-MCNC: 1 MG/DL (ref 0.2–1.3)
BUN SERPL-MCNC: 5 MG/DL (ref 7–30)
CALCIUM SERPL-MCNC: 8.4 MG/DL (ref 8.5–10.1)
CHLORIDE BLD-SCNC: 108 MMOL/L (ref 94–109)
CO2 SERPL-SCNC: 21 MMOL/L (ref 20–32)
CREAT SERPL-MCNC: 0.52 MG/DL (ref 0.52–1.04)
CRP SERPL-MCNC: 130 MG/L (ref 0–8)
EOSINOPHIL # BLD AUTO: 0 10E3/UL (ref 0–0.7)
EOSINOPHIL NFR BLD AUTO: 0 %
ERYTHROCYTE [DISTWIDTH] IN BLOOD BY AUTOMATED COUNT: 11.9 % (ref 10–15)
GFR SERPL CREATININE-BSD FRML MDRD: >90 ML/MIN/1.73M2
GLUCOSE BLD-MCNC: 83 MG/DL (ref 70–99)
HBV SURFACE AG SERPL QL IA: NONREACTIVE
HCT VFR BLD AUTO: 34.9 % (ref 35–47)
HCV AB SERPL QL IA: NONREACTIVE
HGB BLD-MCNC: 11.8 G/DL (ref 11.7–15.7)
HIV 1+2 AB+HIV1 P24 AG SERPL QL IA: NONREACTIVE
IMM GRANULOCYTES # BLD: 0.1 10E3/UL
IMM GRANULOCYTES NFR BLD: 1 %
LACTATE SERPL-SCNC: 0.6 MMOL/L (ref 0.7–2)
LYMPHOCYTES # BLD AUTO: 1.5 10E3/UL (ref 0.8–5.3)
LYMPHOCYTES NFR BLD AUTO: 10 %
MCH RBC QN AUTO: 31.5 PG (ref 26.5–33)
MCHC RBC AUTO-ENTMCNC: 33.8 G/DL (ref 31.5–36.5)
MCV RBC AUTO: 93 FL (ref 78–100)
MONOCYTES # BLD AUTO: 1.5 10E3/UL (ref 0–1.3)
MONOCYTES NFR BLD AUTO: 10 %
NEUTROPHILS # BLD AUTO: 12 10E3/UL (ref 1.6–8.3)
NEUTROPHILS NFR BLD AUTO: 79 %
NRBC # BLD AUTO: 0 10E3/UL
NRBC BLD AUTO-RTO: 0 /100
PLATELET # BLD AUTO: 206 10E3/UL (ref 150–450)
POTASSIUM BLD-SCNC: 3.6 MMOL/L (ref 3.4–5.3)
PROT SERPL-MCNC: 6.4 G/DL (ref 6.8–8.8)
RBC # BLD AUTO: 3.75 10E6/UL (ref 3.8–5.2)
SODIUM SERPL-SCNC: 136 MMOL/L (ref 133–144)
T PALLIDUM AB SER QL: NONREACTIVE
WBC # BLD AUTO: 15.2 10E3/UL (ref 4–11)

## 2022-12-30 PROCEDURE — 258N000003 HC RX IP 258 OP 636: Performed by: HOSPITALIST

## 2022-12-30 PROCEDURE — 36415 COLL VENOUS BLD VENIPUNCTURE: CPT | Performed by: HOSPITALIST

## 2022-12-30 PROCEDURE — 86780 TREPONEMA PALLIDUM: CPT | Performed by: HOSPITALIST

## 2022-12-30 PROCEDURE — 93005 ELECTROCARDIOGRAM TRACING: CPT

## 2022-12-30 PROCEDURE — 87340 HEPATITIS B SURFACE AG IA: CPT | Performed by: HOSPITALIST

## 2022-12-30 PROCEDURE — 250N000011 HC RX IP 250 OP 636: Performed by: HOSPITALIST

## 2022-12-30 PROCEDURE — 87389 HIV-1 AG W/HIV-1&-2 AB AG IA: CPT | Performed by: HOSPITALIST

## 2022-12-30 PROCEDURE — 86140 C-REACTIVE PROTEIN: CPT | Performed by: HOSPITALIST

## 2022-12-30 PROCEDURE — 36415 COLL VENOUS BLD VENIPUNCTURE: CPT | Performed by: INTERNAL MEDICINE

## 2022-12-30 PROCEDURE — 99233 SBSQ HOSP IP/OBS HIGH 50: CPT | Performed by: HOSPITALIST

## 2022-12-30 PROCEDURE — 85025 COMPLETE CBC W/AUTO DIFF WBC: CPT | Performed by: HOSPITALIST

## 2022-12-30 PROCEDURE — 86803 HEPATITIS C AB TEST: CPT | Performed by: HOSPITALIST

## 2022-12-30 PROCEDURE — 250N000013 HC RX MED GY IP 250 OP 250 PS 637: Performed by: HOSPITALIST

## 2022-12-30 PROCEDURE — 120N000001 HC R&B MED SURG/OB

## 2022-12-30 PROCEDURE — 99254 IP/OBS CNSLTJ NEW/EST MOD 60: CPT | Performed by: INTERNAL MEDICINE

## 2022-12-30 PROCEDURE — 87591 N.GONORRHOEAE DNA AMP PROB: CPT | Performed by: HOSPITALIST

## 2022-12-30 PROCEDURE — 83605 ASSAY OF LACTIC ACID: CPT | Performed by: INTERNAL MEDICINE

## 2022-12-30 PROCEDURE — 87491 CHLMYD TRACH DNA AMP PROBE: CPT | Performed by: HOSPITALIST

## 2022-12-30 PROCEDURE — 71046 X-RAY EXAM CHEST 2 VIEWS: CPT

## 2022-12-30 PROCEDURE — 93010 ELECTROCARDIOGRAM REPORT: CPT | Performed by: INTERNAL MEDICINE

## 2022-12-30 PROCEDURE — 80053 COMPREHEN METABOLIC PANEL: CPT | Performed by: HOSPITALIST

## 2022-12-30 RX ADMIN — SODIUM CHLORIDE: 9 INJECTION, SOLUTION INTRAVENOUS at 22:14

## 2022-12-30 RX ADMIN — PROCHLORPERAZINE MALEATE 10 MG: 5 TABLET ORAL at 10:37

## 2022-12-30 RX ADMIN — MEROPENEM 1 G: 1 INJECTION, POWDER, FOR SOLUTION INTRAVENOUS at 21:14

## 2022-12-30 RX ADMIN — ACETAMINOPHEN 650 MG: 325 TABLET, FILM COATED ORAL at 00:06

## 2022-12-30 RX ADMIN — ONDANSETRON 4 MG: 4 TABLET, ORALLY DISINTEGRATING ORAL at 08:35

## 2022-12-30 RX ADMIN — PROCHLORPERAZINE EDISYLATE 10 MG: 5 INJECTION INTRAMUSCULAR; INTRAVENOUS at 00:14

## 2022-12-30 RX ADMIN — ACETAMINOPHEN 650 MG: 325 TABLET, FILM COATED ORAL at 22:14

## 2022-12-30 RX ADMIN — ONDANSETRON 4 MG: 2 INJECTION INTRAMUSCULAR; INTRAVENOUS at 21:07

## 2022-12-30 RX ADMIN — SODIUM CHLORIDE: 9 INJECTION, SOLUTION INTRAVENOUS at 13:23

## 2022-12-30 RX ADMIN — MEROPENEM 1 G: 1 INJECTION, POWDER, FOR SOLUTION INTRAVENOUS at 05:19

## 2022-12-30 RX ADMIN — SODIUM CHLORIDE: 9 INJECTION, SOLUTION INTRAVENOUS at 05:17

## 2022-12-30 RX ADMIN — ACETAMINOPHEN 650 MG: 325 TABLET, FILM COATED ORAL at 10:37

## 2022-12-30 RX ADMIN — MEROPENEM 1 G: 1 INJECTION, POWDER, FOR SOLUTION INTRAVENOUS at 13:14

## 2022-12-30 ASSESSMENT — ACTIVITIES OF DAILY LIVING (ADL)
ADLS_ACUITY_SCORE: 37

## 2022-12-30 NOTE — PROGRESS NOTES
[facility]    Medicine Progress Note - Hospitalist Service    Date of Admission:  12/28/2022    Assessment & Plan   ASSESSMENT  Mya Vidal is a markedly pleasant 27 year old woman without significant past medical history who presents with fever, nausea, vomiting, and abdominal pain, and is found to have SIRS, possibly due to acute left sided pyelonephritis.     PLAN     Sepsis  Likely due to acute left sided pyelonephritis  * Patient presents with acute fever, nausea, vomiting, and abdominal pain for the past 24 hours. No diarrhea  *Complains of left lower abdominal pain radiating to the right lower quadrant.  *Positive left-sided CVA tenderness.  * UA negative; denies ever having dysuria recently or remotely  * procalcitonin elevated at 0.22; CRP trending up to 130  *CT abdomen on admission suggests possible early acute left sided pyelonephritis  Question this given negative UA.   * Influenza, Mono, RSV Covid - NEGATIVE  *Patient admits to unprotected intercourse in October, was concerned for STD transmission at the time.  Plan  --Blood cultures NGTD, continue to follow results.  -- started on ceftriaxone on admission, but due to worsening vitals and clinical status, will broaden to meropenem.  Continue for now.  -- Low threshold for repeat imaging if patient not improving on this antibiotic regimen  -- ID consult to assist with management, UA negative and clinical picture not consistent fully with pyelonephritis  -- STD screen sent.  Follow-up syphilis, HIV, chlamydia and gonorrhea.       Chronic anxiety:   -- Hold Wellbutrin for now.  Consider restarting soon     Rule Out COVID-19 infection  This patient was evaluated during a global COVID-19 pandemic, which necessitated consideration that the patient might be at risk for infection with the SARS-CoV-2 virus that causes COVID-19. Applicable protocols for evaluation were followed during the patient's care.   - covid negative        Diet: Advance Diet as Tolerated: Clear Liquid Diet    DVT Prophylaxis: ambulate  García Catheter: Not present  Central Lines: None  Cardiac Monitoring: None  Code Status: Full Code      Disposition Plan      Expected Discharge Date: 12/31/2022                The patient's care was discussed with the Bedside Nurse and Patient.    Aimee Nance MD  Hospitalist Service  [facility]  Securely message with the [employer]oc[employer] Web Console (learn more here)  Text page via [employer] Paging/Directory         Clinically Significant Risk Factors Present on Admission         # Hyponatremia: Lowest Na = 130 mmol/L in last 2 days, will monitor as appropriate  # Hypocalcemia: Lowest Ca = 8.1 mg/dL in last 2 days, will monitor and replace as appropriate     # Hypoalbuminemia: Lowest albumin = 3.2 g/dL at 12/29/2022  7:19 AM, will monitor as appropriate                  ______________________________________________________________________    Interval History   Patient laying in bed.  Continues to feel clinically unwell.  Continues to complain of left lower quadrant pain.  Now complaining of pleuritic chest pain today.  Also complaining of left-sided chest pain below the left breast.  Worse on palpation.  She did admit to unprotected intercourse but back in October.  Was concerned at the time for STD.  Took a test 2 weeks following which was negative.    Data reviewed today: I reviewed all medications, new labs and imaging results over the last 24 hours. I personally reviewed no images or EKG's today.    Physical Exam   Vital Signs: Temp: 98.6  F (37  C) Temp src: Oral BP: 114/80 Pulse: 106   Resp: 16 SpO2: 95 % O2 Device: None (Room air)    Weight: 161 lbs 13.08 oz  General Appearance: Well appearing for stated age.  Respiratory: CTAB, no rales or ronchi  Cardiovascular: S1, S2 normal, no murmurs  GI: non-tender on palpation, BS present      Data   Recent Labs   Lab 12/30/22  0905 12/29/22  0719 12/28/22  2136   WBC  15.2* 18.2* 17.7*   HGB 11.8 12.1 13.1   MCV 93 94 93    226 248    133 130*   POTASSIUM 3.6 3.6 3.4   CHLORIDE 108 104 99   CO2 21 22 22   BUN 5* 6* 10   CR 0.52 0.67 0.66   ANIONGAP 7 7 9   AREN 8.4* 8.1* 9.4   GLC 83 87 100*   ALBUMIN 3.1* 3.2* 4.0   PROTTOTAL 6.4* 6.5* 7.7   BILITOTAL 1.0 1.4* 1.3   ALKPHOS 52 57 65   ALT 16 16 19   AST 14 15 16   LIPASE  --   --  77     Recent Results (from the past 24 hour(s))   XR Chest 2 Views    Narrative    CHEST TWO VIEWS 12/30/2022 11:35 AM     HISTORY: Pleuritic chest pain.    COMPARISON: None.       Impression    IMPRESSION: No pneumothorax. There are no acute infiltrates. The  cardiac silhouette is not enlarged. Pulmonary vasculature is  unremarkable.    ELYSSA ANGEL MD         SYSTEM ID:  [id number]

## 2022-12-30 NOTE — PLAN OF CARE
Goal Outcome Evaluation:      Plan of Care Reviewed With: patient, grandparent, other (see comments)aunt    Overall Patient Progress: improving    Outcome Evaluation: No vomiting since this morning; nausea persists. VS improved.    Date/Time: 12/29 3-11:30 shift    Trauma/Ortho/Medical (Choose one) Medical    Diagnosis: Nausea and vomiting, possible pyelonephritis  POD#: N/A  Mental Status: A&O x4, very lethargic  Activity/dangle: SBA x1. Reluctant to wait for help to get up to bathroom and sets off bed alarm frequently. Pretty stable but due to lethargy and VS, should still receive supervision/assist to bathroom.   Diet: Clear liquid  Pain: None  García/Voiding: Bathroom  Tele/Restraints/Iso: No  02/LDA: PIV running 150 mL/hr  D/C Date: Estimated 2 days  Other Info: May move to another unit eventually but beds are full at this time. Vitals have stabilized, fever down after tylenol. Receiving reglan and compazine for nausea, not totally effective but has not vomited since this morning.

## 2022-12-30 NOTE — PROVIDER NOTIFICATION
Paged hospitalist: complain of chest pain around sternum area while using bathroom, when laying in bed, pain is at the left under breast, VSS on RA. Can we order stat EKG. Awaiting for call back.     @1030: MD @ bedside

## 2022-12-30 NOTE — PROGRESS NOTES
Patient is Stand by assist, A&O x 4. VSS on RA except for elevated HR . Clear liquid diet. On continuous NS IV at 150 ml/hr. Voiding adequately in bathroom. Compazine given for nausea. Tylenol given for pain. Denied pain at this time. Will continue to monitor.

## 2022-12-30 NOTE — PROVIDER NOTIFICATION
Paged hospitalist: patient is requesting to advance diet, since she is not nauseous but still has abdominal discomfort.     @ 1651: advance diet as tolerated per hospitalist order. Full liquid diet in place. Updated patient.

## 2022-12-30 NOTE — UTILIZATION REVIEW
Admission Status; Secondary Review Determination         Under the authority of the Utilization Management Committee, the utilization review process indicated a secondary review on the above patient.  The review outcome is based on review of the medical records, discussions with staff, and applying clinical experience noted on the date of the review.        (x)      Inpatient Status Appropriate - This patient's medical care is consistent with medical management for inpatient care and reasonable inpatient medical practice.       RATIONALE FOR DETERMINATION   The patient is a 27-year-old female who presented with fever nausea vomiting and abdominal pain and suspected SIRS.  CT scan suggest possible acute left-sided pyelonephritis.  UA is negative so far.  Initially started on empiric ceftriaxone.  White count is elevated at 18.2.  CRP is very high consistent with generalized inflammation at 130.  Procalcitonin is elevated 0.22 consistent with low risk for systemic infection.  Patient is negative for influenza testing and COVID-19.  Due to persistent fever and tachycardia up to 120, the patient was advanced from ceftriaxone to meropenem.  Based on significant systemic infection and possible pyelonephritis acutely, recommend continuation of inpatient status.      The severity of illness, intensity of service provided, expected LOS and risk for adverse outcome make the care complex, high risk and appropriate for hospital admission.        The information on this document is developed by the utilization review team in order for the business office to ensure compliance.  This only denotes the appropriateness of proper admission status and does not reflect the quality of care rendered.         The definitions of Inpatient Status and Observation Status used in making the determination above are those provided in the CMS Coverage Manual, Chapter 1 and Chapter 6, section 70.4.      Sincerely,     Derrell Olguin MD  Physician  Advisor  Utilization Review/ Case Management  [facility].

## 2022-12-30 NOTE — PROVIDER NOTIFICATION
Pt on tele, not ordered. Paged hospitalist on call to see if it should stay on. Pt tachy at baseline around 120s, has been up to 140 this shift but NSR.     OK to remove tele.

## 2022-12-30 NOTE — CONSULTS
ID consult dictated IMP 1 27-year-old healthy female, 3-day illness, rigors, chills, diffuse abdominal pain more focused on the left no urinary tract symptoms, left flank pain now prominent cultures negative, etiology is unclear imaging suggest possible Pyelo but urine completely normal no urine culture done    REC 1 Etiology of the syndrome is unclear, having rigors as a seizure and labs would suggest some type of bacterial process, very unusual to have pyelonephritis with no urinary symptoms and normal urine but not unheard of, if its Pyelo or likely not any of the microbiology currently getting meropenem but no particular likelihood we need that type of broad coverage here, if that is the diagnosis would not improve immediately on ceftriaxone so doubt it failed  Next course of action here depends on clinical course and culture

## 2022-12-30 NOTE — PLAN OF CARE
Goal Outcome Evaluation:    Patient is A&O x4. On tele with ST. Up with SBA. Ambulated in hallway few times with nursing, tolerated well. Complained of chest pain x1 this morning, MD aware. IVF infusing. Complains of intermittent nausea, managed with Zofran and compazine. On full liquid diet. Voiding adequately, no BM this shift.  Will continue to monitor.

## 2022-12-31 LAB
C TRACH DNA SPEC QL NAA+PROBE: NEGATIVE
N GONORRHOEA DNA SPEC QL NAA+PROBE: NEGATIVE

## 2022-12-31 PROCEDURE — 258N000003 HC RX IP 258 OP 636: Performed by: HOSPITALIST

## 2022-12-31 PROCEDURE — 99233 SBSQ HOSP IP/OBS HIGH 50: CPT | Performed by: INTERNAL MEDICINE

## 2022-12-31 PROCEDURE — 99232 SBSQ HOSP IP/OBS MODERATE 35: CPT | Performed by: INTERNAL MEDICINE

## 2022-12-31 PROCEDURE — 250N000013 HC RX MED GY IP 250 OP 250 PS 637: Performed by: INTERNAL MEDICINE

## 2022-12-31 PROCEDURE — 250N000009 HC RX 250: Performed by: INTERNAL MEDICINE

## 2022-12-31 PROCEDURE — 250N000013 HC RX MED GY IP 250 OP 250 PS 637: Performed by: HOSPITALIST

## 2022-12-31 PROCEDURE — 250N000011 HC RX IP 250 OP 636: Performed by: HOSPITALIST

## 2022-12-31 PROCEDURE — 120N000001 HC R&B MED SURG/OB

## 2022-12-31 RX ORDER — BUPROPION HYDROCHLORIDE 150 MG/1
150 TABLET ORAL DAILY
Status: DISCONTINUED | OUTPATIENT
Start: 2022-12-31 | End: 2023-01-01 | Stop reason: HOSPADM

## 2022-12-31 RX ORDER — DIPHENHYDRAMINE HCL 25 MG
25 CAPSULE ORAL EVERY 6 HOURS PRN
Status: DISCONTINUED | OUTPATIENT
Start: 2022-12-31 | End: 2023-01-01 | Stop reason: HOSPADM

## 2022-12-31 RX ORDER — LIDOCAINE 40 MG/G
CREAM TOPICAL
Status: COMPLETED | OUTPATIENT
Start: 2022-12-31 | End: 2022-12-31

## 2022-12-31 RX ORDER — DIPHENHYDRAMINE HYDROCHLORIDE 50 MG/ML
25 INJECTION INTRAMUSCULAR; INTRAVENOUS EVERY 6 HOURS PRN
Status: DISCONTINUED | OUTPATIENT
Start: 2022-12-31 | End: 2023-01-01 | Stop reason: HOSPADM

## 2022-12-31 RX ADMIN — BUPROPION HYDROCHLORIDE 150 MG: 150 TABLET, FILM COATED, EXTENDED RELEASE ORAL at 15:47

## 2022-12-31 RX ADMIN — MEROPENEM 1 G: 1 INJECTION, POWDER, FOR SOLUTION INTRAVENOUS at 06:21

## 2022-12-31 RX ADMIN — MEROPENEM 1 G: 1 INJECTION, POWDER, FOR SOLUTION INTRAVENOUS at 21:20

## 2022-12-31 RX ADMIN — MEROPENEM 1 G: 1 INJECTION, POWDER, FOR SOLUTION INTRAVENOUS at 13:05

## 2022-12-31 RX ADMIN — ACETAMINOPHEN 650 MG: 325 TABLET, FILM COATED ORAL at 21:20

## 2022-12-31 RX ADMIN — ACETAMINOPHEN 650 MG: 325 TABLET, FILM COATED ORAL at 07:37

## 2022-12-31 RX ADMIN — LIDOCAINE: 40 CREAM TOPICAL at 20:16

## 2022-12-31 RX ADMIN — SODIUM CHLORIDE: 9 INJECTION, SOLUTION INTRAVENOUS at 12:20

## 2022-12-31 RX ADMIN — SODIUM CHLORIDE: 9 INJECTION, SOLUTION INTRAVENOUS at 05:02

## 2022-12-31 ASSESSMENT — ACTIVITIES OF DAILY LIVING (ADL)
TOILETING_ISSUES: NO
VISION_MANAGEMENT: GLASSES
CONCENTRATING,_REMEMBERING_OR_MAKING_DECISIONS_DIFFICULTY: NO
DRESSING/BATHING_DIFFICULTY: NO
ADLS_ACUITY_SCORE: 37
ADLS_ACUITY_SCORE: 37
HEARING_DIFFICULTY_OR_DEAF: NO
ADLS_ACUITY_SCORE: 37
ADLS_ACUITY_SCORE: 37
ADLS_ACUITY_SCORE: 22
ADLS_ACUITY_SCORE: 37
DOING_ERRANDS_INDEPENDENTLY_DIFFICULTY: NO
ADLS_ACUITY_SCORE: 22
DIFFICULTY_EATING/SWALLOWING: NO
WALKING_OR_CLIMBING_STAIRS_DIFFICULTY: NO
DIFFICULTY_COMMUNICATING: NO
FALL_HISTORY_WITHIN_LAST_SIX_MONTHS: NO
ADLS_ACUITY_SCORE: 37
WEAR_GLASSES_OR_BLIND: YES
CHANGE_IN_FUNCTIONAL_STATUS_SINCE_ONSET_OF_CURRENT_ILLNESS/INJURY: NO
ADLS_ACUITY_SCORE: 22
ADLS_ACUITY_SCORE: 37

## 2022-12-31 NOTE — PLAN OF CARE
Goal Outcome Evaluation:    Patient is A&O x4. Up with SBA to bathroom, voiding. Ambulated in hallway few times with nursing. On regular diet, tolerating well. Denies nausea this shift. Complains of rashes on face, updated MD. Will continue to monitor.

## 2022-12-31 NOTE — PROGRESS NOTES
[facility]    Medicine Progress Note - Hospitalist Service    Date of Admission:  12/28/2022    Assessment & Plan   ASSESSMENT  Mya Vidal is a markedly pleasant 27 year old woman without significant past medical history who presents with 3 days of fever, rigors, nausea, vomiting, and abdominal pain now localized flank.  No diarrhea or lower urinary tract symptoms.    Imaging suggestive of left pyelonephritis, UA negative for infection.  No urine cultures were obtained.  Initially started on ceftriaxone broadened to meropenem which she is currently on.  Blood cultures are negative so far.  COVID-19 undetected.  Chest x-ray with no acute findings    Abdominal pain, progressed to left flank pain  Imaging concerning for left-sided pyelonephritis   -- Blood cultures NGTD, continue to follow results.  -- Continue meropenem for now.  -- ID consult to assist with management,  -- UA negative and clinical picture not consistent fully with pyelonephritis    Hyponatremia, 130>>136, resolved  - presumed hypovolemia related  - initiate gentle IVF hydration, will cont monitor daily.     Hematuria versus vaginal bleeding  -  States she noticed blood in the front of her underwear and on urination  - Not menses time, she is typically regular, not blood in vaginal vault  - could be from migrating nonobstructive kidney not seen on CT  - will get UA to assess for hematuria.     -- STD screen sent at the request of the patient by previous provider.    Follow-up syphilis, HIV, chlamydia and gonorrhea.    Chronic anxiety:   -- Hold Wellbutrin for now.  Will resume.     Diet: Full Liquid Diet    DVT Prophylaxis: ambulate  García Catheter: Not present  Central Lines: None  Cardiac Monitoring: ACTIVE order. Indication: Chest pain/ ACS rule out (24 hours)  Code Status: Full Code      Disposition Plan      Expected Discharge Date: 01/01/2023,  9:00 AM         Pending clinical improvement and de-escalation to  oral antibiotics on discharge.     The patient's care was discussed with the Bedside Nurse and Patient.    Shea Urrutia MD  Hospitalist Service  [facility]  Securely message with the [employer] Web Console (learn more here)  Text page via [employer] Paging/Directory         Clinically Significant Risk Factors              # Hypoalbuminemia: Lowest albumin = 3.1 g/dL at 12/30/2022  9:05 AM, will monitor as appropriate                    ______________________________________________________________________    Interval History   Continues to complain of left lower quadrant pain and rigors.  Hemodynamically stable.  She has been afebrile for last 24 hours.      Data reviewed today: I reviewed all medications, new labs and imaging results over the last 24 hours. I personally reviewed no images or EKG's today.    Physical Exam   Vital Signs: Temp: 98.5  F (36.9  C) Temp src: Oral BP: 115/78 Pulse: 99   Resp: 16 SpO2: 97 % O2 Device: None (Room air)    Weight: 161 lbs 8 oz  General Appearance: Well appearing for stated age.  Respiratory: CTAB, no rales or ronchi  Cardiovascular: S1, S2 normal, no murmurs  GI: non-tender on palpation, BS present      Data   Recent Labs   Lab 12/30/22  0905 12/29/22  0719 12/28/22  2136   WBC 15.2* 18.2* 17.7*   HGB 11.8 12.1 13.1   MCV 93 94 93    226 248    133 130*   POTASSIUM 3.6 3.6 3.4   CHLORIDE 108 104 99   CO2 21 22 22   BUN 5* 6* 10   CR 0.52 0.67 0.66   ANIONGAP 7 7 9   AREN 8.4* 8.1* 9.4   GLC 83 87 100*   ALBUMIN 3.1* 3.2* 4.0   PROTTOTAL 6.4* 6.5* 7.7   BILITOTAL 1.0 1.4* 1.3   ALKPHOS 52 57 65   ALT 16 16 19   AST 14 15 16   LIPASE  --   --  77     Recent Results (from the past 24 hour(s))   XR Chest 2 Views    Narrative    CHEST TWO VIEWS 12/30/2022 11:35 AM     HISTORY: Pleuritic chest pain.    COMPARISON: None.       Impression    IMPRESSION: No pneumothorax. There are no acute infiltrates. The  cardiac silhouette is not enlarged. Pulmonary  vasculature is  unremarkable.    ELYSSA ANGEL MD         SYSTEM ID:  [id number]

## 2022-12-31 NOTE — PLAN OF CARE
Goal Outcome Evaluation:    Pt is A&Ox4, VSS on room air, Tele ST, ambulating with stand by assist to bathroom and in chew. Denied chest pain, denied SOB, denied pain. IV infusing, c/o nausea; Zofran given x1 with relief. Full liquid diet, continue to monitor.

## 2022-12-31 NOTE — PROVIDER NOTIFICATION
MD Notification    Notified Person: MD    Notified Person Name: Dr. Urrutia    Notification Date/Time: 12/31/2022 0944    Notification Interaction: Vocerna    Purpose of Notification: patient had few spots of blood in toilet and panties,  Patient worried. Denies dizziness    Orders Received: Awaiting for call back.     Comments:

## 2022-12-31 NOTE — PROGRESS NOTES
[facility]  Infectious Disease Progress Note          Assessment and Plan:   IMPRESSION:    1.  Healthy 27-year-old female with acute febrile illness including rigors, leukocytosis, abdominal pain and GI symptoms, imaging without clear cut diagnosis, although left kidney, mild abnormality and left flank discomfort, so possible pyelo, of note no lower urinary tract symptoms and completely normal urinalysis, no positive microbiology so far on broad antibiotics, still clinically feeling poorly.  Diagnosis not entirely clear.     RECOMMENDATIONS:    1On meropenem, no particular likely reason for this type broad coverage, but l continue for now, no urine culture, even done.  Urinalysis was normal.  If it is pyelonephritis may take a while to get better regardless of antibiotic treatment, if she clinically improves, probably attempt empiric oral conversion soon. If she not clinically improving expand workup.  2 Feels better less pain and chills new today vaginal bleeding and not menses time ?? Some of pain was lower and pelvic, CT was neg        Interval History:   no new complaints better still feels ill  Vag bleeding 2 weeks early, no fever now micro neg              Medications:       meropenem  1 g Intravenous Q8H                  Physical Exam:   Blood pressure 115/78, pulse 99, temperature 98.5  F (36.9  C), temperature source Oral, resp. rate 16, weight 73.3 kg (161 lb 8 oz), SpO2 97 %.  Wt Readings from Last 2 Encounters:   12/31/22 73.3 kg (161 lb 8 oz)     Vital Signs with Ranges  Temp:  [98.5  F (36.9  C)-99.1  F (37.3  C)] 98.5  F (36.9  C)  Pulse:  [] 99  Resp:  [16] 16  BP: (104-116)/(76-79) 115/78  SpO2:  [97 %-100 %] 97 %    Constitutional: Awake, alert, cooperative, no apparent distress   Lungs: Clear to auscultation bilaterally, no crackles or wheezing   Cardiovascular: Regular rate and rhythm, normal S1 and S2, and no murmur noted   Abdomen: Normal bowel sounds, soft,  non-distended, non-tender   Skin: No rashes, no cyanosis, no edema   Other:           Data:   All microbiology laboratory data reviewed.  Recent Labs   Lab Test 12/30/22  0905 12/29/22  0719 12/28/22 2136   WBC 15.2* 18.2* 17.7*   HGB 11.8 12.1 13.1   HCT 34.9* 36.6 38.3   MCV 93 94 93    226 248     Recent Labs   Lab Test 12/30/22 0905 12/29/22  0719 12/28/22  2136   CR 0.52 0.67 0.66     No lab results found.  No lab results found.    Invalid input(s): UC

## 2022-12-31 NOTE — CONSULTS
Consult Date: 12/31/2022    INFECTIOUS DISEASE CONSULTATION    LOCATION:  Room 2311, [facility]     REFERRING PHYSICIAN:  Dr. Sky    IMPRESSION:    1.  Healthy 27-year-old female with acute febrile illness including rigors, leukocytosis, abdominal pain and GI symptoms, imaging without clear cut diagnosis, although left kidney, mild abnormality and left flank discomfort, so possible pyelo, of note no lower urinary tract symptoms and completely normal urinalysis, no positive microbiology so far on broad antibiotics, still clinically feeling poorly.  Diagnosis not entirely clear.    RECOMMENDATIONS:    1.  Getting meropenem, no particular likely reason for this type broad coverage, but logical to continue for now, no urine culture, even done.  Urinalysis was normal.  If it is pyelonephritis may take a while to get better regardless of antibiotic treatment, if she clinically improves, probably attempt empiric oral conversion soon. If she not clinically improving expand workup.    HISTORY OF PRESENT ILLNESS:  This 27-year-old female is seen in consultation.  She is generally healthy, has no particular history of recurrent urinary tract infections or other major infection or medical problems.  She presents with history of 3 days of shaking chills, abdominal pain, nausea, mostly without diarrhea or lower urinary tract symptoms.  She has no significant other focal symptoms.  The abdominal symptoms have evolved into a somewhat left sided in the left flank discomfort and at presentation imaging suggested left pyelonephritis, despite no lower urinary tract symptoms and a normal urine.  Cultures were obtained.  She was started on ceftriaxone and then quickly switched over to meropenem.  She still feels ill and in fact while I was seeing her was having rigors again.  No other new localizing symptoms.  No respiratory symptoms.  No recent travel exposures.  No one else she has been around has been  ill.    PAST MEDICAL HISTORY:  Fairly unremarkable, maybe a couple of urinary tract infections in the past and pneumonia previously.  No significant other infections.    ALLERGIES:  NONE.    SOCIAL AND FAMILY HISTORY:  No recent travel exposures.  No COVID-19 exposures.  No one she has been around has been ill.  No family history relevant to current illness.    MEDICATIONS:  As listed.    REVIEW OF SYSTEMS:  Unremarkable otherwise.  No other localizing symptoms, or GI symptoms have improved.  Never had significant diarrhea.  Still has poor appetite, but no major nausea.  Pain still is somewhat abdomen, but also now left flank area, especially tender in that area.    PHYSICAL EXAMINATION:    GENERAL:  The patient appears her stated age.  She is having mild rigors as I see her.  VITAL SIGNS:  Currently afebrile, but earlier 101.  HEENT:  No thrush or oropharyngeal lesions.  Pupils reactive.  No facial skin rashes.  NECK:  Supple and nontender.  No lymphadenopathy, thyromegaly, or meningismus.  HEART:  Regular rhythm, no major murmur, not tachycardic.  LUNGS:  Clear bilaterally.  ABDOMEN:  Mild tenderness across the abdomen somewhat more into the left flank and left abdomen area, although somewhat lower than the actual kidney area itself.  EXTREMITIES:  No major rashes, skin lesions or embolic lesions.    LABORATORY DATA:  White count elevated, procalcitonin, minimally abnormal.  Imaging with a slight left kidney abnormality.  Urinalysis completely normal.  Blood culture so far negative.    Thank you very much for the consultation.  I will follow the patient with you.    Sav Mackey MD        D: 2022   T: 2022   MT: RIK    Name:     KARTHIKEYAN DONOHUE  MRN:      [id number]        Account:      [id number]   :      1995           Consult Date: 2022     Document: [id number]

## 2023-01-01 VITALS
TEMPERATURE: 98 F | HEART RATE: 98 BPM | SYSTOLIC BLOOD PRESSURE: 117 MMHG | RESPIRATION RATE: 16 BRPM | OXYGEN SATURATION: 96 % | DIASTOLIC BLOOD PRESSURE: 82 MMHG | WEIGHT: 154.32 LBS

## 2023-01-01 LAB
ALBUMIN UR-MCNC: NEGATIVE MG/DL
APPEARANCE UR: CLEAR
BASOPHILS # BLD AUTO: 0 10E3/UL (ref 0–0.2)
BASOPHILS NFR BLD AUTO: 0 %
BILIRUB UR QL STRIP: NEGATIVE
COLOR UR AUTO: ABNORMAL
EOSINOPHIL # BLD AUTO: 0.1 10E3/UL (ref 0–0.7)
EOSINOPHIL NFR BLD AUTO: 1 %
ERYTHROCYTE [DISTWIDTH] IN BLOOD BY AUTOMATED COUNT: 11.9 % (ref 10–15)
GLUCOSE UR STRIP-MCNC: NEGATIVE MG/DL
HCT VFR BLD AUTO: 31.6 % (ref 35–47)
HGB BLD-MCNC: 11.1 G/DL (ref 11.7–15.7)
HGB UR QL STRIP: ABNORMAL
IMM GRANULOCYTES # BLD: 0 10E3/UL
IMM GRANULOCYTES NFR BLD: 0 %
KETONES UR STRIP-MCNC: NEGATIVE MG/DL
LEUKOCYTE ESTERASE UR QL STRIP: NEGATIVE
LYMPHOCYTES # BLD AUTO: 2.4 10E3/UL (ref 0.8–5.3)
LYMPHOCYTES NFR BLD AUTO: 26 %
MCH RBC QN AUTO: 31.5 PG (ref 26.5–33)
MCHC RBC AUTO-ENTMCNC: 35.1 G/DL (ref 31.5–36.5)
MCV RBC AUTO: 90 FL (ref 78–100)
MONOCYTES # BLD AUTO: 1.2 10E3/UL (ref 0–1.3)
MONOCYTES NFR BLD AUTO: 13 %
MUCOUS THREADS #/AREA URNS LPF: PRESENT /LPF
NEUTROPHILS # BLD AUTO: 5.7 10E3/UL (ref 1.6–8.3)
NEUTROPHILS NFR BLD AUTO: 60 %
NITRATE UR QL: NEGATIVE
NRBC # BLD AUTO: 0 10E3/UL
NRBC BLD AUTO-RTO: 0 /100
PH UR STRIP: 7.5 [PH] (ref 5–7)
PLATELET # BLD AUTO: 239 10E3/UL (ref 150–450)
RBC # BLD AUTO: 3.52 10E6/UL (ref 3.8–5.2)
RBC URINE: 1 /HPF
SP GR UR STRIP: 1.01 (ref 1–1.03)
SQUAMOUS EPITHELIAL: 3 /HPF
UROBILINOGEN UR STRIP-MCNC: NORMAL MG/DL
WBC # BLD AUTO: 9.6 10E3/UL (ref 4–11)
WBC URINE: 1 /HPF

## 2023-01-01 PROCEDURE — 81003 URINALYSIS AUTO W/O SCOPE: CPT | Performed by: INTERNAL MEDICINE

## 2023-01-01 PROCEDURE — 36415 COLL VENOUS BLD VENIPUNCTURE: CPT | Performed by: INTERNAL MEDICINE

## 2023-01-01 PROCEDURE — 99239 HOSP IP/OBS DSCHRG MGMT >30: CPT | Performed by: INTERNAL MEDICINE

## 2023-01-01 PROCEDURE — 250N000013 HC RX MED GY IP 250 OP 250 PS 637: Performed by: INTERNAL MEDICINE

## 2023-01-01 PROCEDURE — 250N000013 HC RX MED GY IP 250 OP 250 PS 637: Performed by: HOSPITALIST

## 2023-01-01 PROCEDURE — 85025 COMPLETE CBC W/AUTO DIFF WBC: CPT | Performed by: INTERNAL MEDICINE

## 2023-01-01 PROCEDURE — 99232 SBSQ HOSP IP/OBS MODERATE 35: CPT | Performed by: INTERNAL MEDICINE

## 2023-01-01 PROCEDURE — 250N000011 HC RX IP 250 OP 636: Performed by: HOSPITALIST

## 2023-01-01 RX ORDER — PROCHLORPERAZINE MALEATE 10 MG
10 TABLET ORAL EVERY 6 HOURS PRN
Qty: 10 TABLET | Refills: 0 | Status: SHIPPED | OUTPATIENT
Start: 2023-01-01

## 2023-01-01 RX ORDER — LEVOFLOXACIN 500 MG/1
500 TABLET, FILM COATED ORAL DAILY
Status: DISCONTINUED | OUTPATIENT
Start: 2023-01-01 | End: 2023-01-01 | Stop reason: HOSPADM

## 2023-01-01 RX ORDER — LEVOFLOXACIN 500 MG/1
500 TABLET, FILM COATED ORAL DAILY
Qty: 6 TABLET | Refills: 0 | Status: SHIPPED | OUTPATIENT
Start: 2023-01-02 | End: 2023-01-08

## 2023-01-01 RX ADMIN — BUPROPION HYDROCHLORIDE 150 MG: 150 TABLET, FILM COATED, EXTENDED RELEASE ORAL at 10:04

## 2023-01-01 RX ADMIN — DIPHENHYDRAMINE HYDROCHLORIDE 25 MG: 25 CAPSULE ORAL at 10:10

## 2023-01-01 RX ADMIN — MELATONIN TAB 3 MG 3 MG: 3 TAB at 00:40

## 2023-01-01 RX ADMIN — PROCHLORPERAZINE MALEATE 10 MG: 5 TABLET ORAL at 07:06

## 2023-01-01 RX ADMIN — ACETAMINOPHEN 650 MG: 325 TABLET, FILM COATED ORAL at 10:09

## 2023-01-01 RX ADMIN — MEROPENEM 1 G: 1 INJECTION, POWDER, FOR SOLUTION INTRAVENOUS at 06:13

## 2023-01-01 RX ADMIN — LEVOFLOXACIN 500 MG: 500 TABLET, FILM COATED ORAL at 13:37

## 2023-01-01 ASSESSMENT — ACTIVITIES OF DAILY LIVING (ADL)
ADLS_ACUITY_SCORE: 23
ADLS_ACUITY_SCORE: 22
ADLS_ACUITY_SCORE: 23
ADLS_ACUITY_SCORE: 22

## 2023-01-01 NOTE — PLAN OF CARE
Goal Outcome Evaluation:         Summary:  12-31-22, [id number]    A/O x4. VSS on RA. Ind. Reg diet. Pain in ab, tylenol given x1. No nausea, SOB. Lung sounds clear. Bowel sounds active. CMS intact. Continue to monitor. Possible discharge tomorrow.

## 2023-01-01 NOTE — DISCHARGE SUMMARY
[facility]  Hospitalist Discharge Summary      Date of Admission:  12/28/2022  Date of Discharge:  1/1/2023  Discharging Provider: Shea Urrutia MD  Discharge Service: Hospitalist Service    Discharge Diagnoses   Abdominal pain  Left-sided pyelonephritis noted on CT abdomen  UA negative and no culture was obtained    Follow-ups Needed After Discharge   Follow-up Appointments     Follow-up and recommended labs and tests       Follow up with primary care provider, Physician No Ref-Primary, within 7   days for hospital follow- up.  No follow up labs or test are needed.         Follow-up with PCP within 1 week from discharge    Unresulted Labs Ordered in the Past 30 Days of this Admission     Date and Time Order Name Status Description    12/29/2022  2:54 AM Blood Culture Peripheral Blood Preliminary     12/29/2022  2:54 AM Blood Culture Peripheral Blood Preliminary       These results will be followed up by PCP    Discharge Disposition   Discharged to home  Condition at discharge: Stable      Hospital Course   ASSESSMENT  Mya Vidal is a markedly pleasant 27 year old woman without significant past medical history who presents with 3 days of fever, rigors, nausea, vomiting, and abdominal pain now localized flank.  No diarrhea or lower urinary tract symptoms.    Imaging suggestive of left pyelonephritis, UA negative for infection.  No urine cultures were obtained.  Initially started on ceftriaxone broadened to meropenem due to concern for hemodynamic instability mostly borderline low blood pressure which improved with IV fluids.  Blood cultures are negative so far.  COVID-19 undetected.  Chest x-ray with no acute findings. Patient feeling well today, abdominal pain almost resolved, and requesting to go home.  She did develop mild facial rash attributed to meropenem.  Educated patient to avoid any penicillin or drugs with sulfa component in the near future. Infectious disease was consulted,  and recommended to switch to oral Levaquin 500 daily for total 7 days.        Hyponatremia, 130>>136, resolved with IVF  - presumed hypovolemia related      Vaginal bleeding noted on physical exam  - Suspicious for early menses as she is a week out from when she usually gets her menstrual period    -- STD screen sent at the request of the patient by previous provider.    Negative for syphilis, HIV, chlamydia and gonorrhea.    Chronic anxiety:   -- resume Wellbutrin on discharge.    Consultations This Hospital Stay   INFECTIOUS DISEASES IP CONSULT    Code Status   Full Code    Time Spent on this Encounter   I, Shea Urrutia MD, personally saw the patient today and spent greater than 30 minutes discharging this patient.       Shea Urrutia MD  [facility]  [address] [address]  Phone: [phone]  Fax: [phone]  ______________________________________________________________________    Physical Exam   Vital Signs: Temp: 98  F (36.7  C) Temp src: Oral BP: 117/82 Pulse: 98   Resp: 16 SpO2: 96 % O2 Device: None (Room air)    Weight: 154 lbs 5.15 oz    General: AO X 3, lying comfortably in bed in no apparent distress  HEENT: pupils equal and reactive to light and accommodation, extraocular movements are intact; oral mucosa moist  Neck: Supple no raised JVD, no rigidity  Respiratory: lungs b/l clear to auscultation, no wheezing or crepitations  CVS: nl s1 s2, regular rate and rhythm, no murmur  P/A: soft, nt,nd, no guarding rigidity or rebound tenderness  Ext: no edema  Neuro: no focal neurological deficits noted, cranial nerves 2-12 grossly intact  Psychiatry: normal mood and affect  Skin: No obvious skin rashes or ulcers         Primary Care Physician   Physician No Ref-Primary    Discharge Orders      Reason for your hospital stay    Acute pyelonephritis     Follow-up and recommended labs and tests     Follow up with primary care provider, Physician No Ref-Primary, within  7 days for hospital follow- up.  No follow up labs or test are needed.     Activity    Your activity upon discharge: activity as tolerated     Diet    Follow this diet upon discharge: Orders Placed This Encounter      Regular Diet Adult       Significant Results and Procedures   Results for orders placed or performed during the hospital encounter of 12/28/22   CT Abdomen Pelvis w Contrast    Narrative    EXAM: CT ABDOMEN PELVIS W CONTRAST  LOCATION: [facility]  DATE/TIME: 12/29/2022 1:55 AM    INDICATION: acute abd pain, fever, vomiting; no prior surgery  COMPARISON: None.  TECHNIQUE: CT scan of the abdomen and pelvis was performed following injection of IV contrast. Multiplanar reformats were obtained. Dose reduction techniques were used.  CONTRAST: 78mL Isovue 370    FINDINGS:   LOWER CHEST: Normal.    HEPATOBILIARY: Normal.    PANCREAS: Normal.    SPLEEN: Normal.    ADRENAL GLANDS: Normal.    KIDNEYS/BLADDER: There is an area of decreased enhancement in the lower pole of the left kidney. The kidneys otherwise appear normal. No hydronephrosis.    BOWEL: There is no bowel obstruction or inflammation. The appendix is normal. No free intraperitoneal gas or fluid.    LYMPH NODES: Normal.    VASCULATURE: Unremarkable.    PELVIC ORGANS: 1.9 cm right adnexal cyst, within normal limits.    MUSCULOSKELETAL: Normal.      Impression    IMPRESSION:   1.  Possible left pyelonephritis. No abscess or hydronephrosis.  2.  No other acute abnormality.   XR Chest 2 Views    Narrative    CHEST TWO VIEWS 12/30/2022 11:35 AM     HISTORY: Pleuritic chest pain.    COMPARISON: None.       Impression    IMPRESSION: No pneumothorax. There are no acute infiltrates. The  cardiac silhouette is not enlarged. Pulmonary vasculature is  unremarkable.    ELYSSA ANGEL MD         SYSTEM ID:  [id number]       Discharge Medications   Current Discharge Medication List      START taking these medications    Details   levofloxacin  (LEVAQUIN) 500 MG tablet Take 1 tablet (500 mg) by mouth daily for 6 days  Qty: 6 tablet, Refills: 0    Associated Diagnoses: Pyelonephritis, acute      prochlorperazine (COMPAZINE) 10 MG tablet Take 1 tablet (10 mg) by mouth every 6 hours as needed for vomiting  Qty: 10 tablet, Refills: 0    Associated Diagnoses: Nausea         CONTINUE these medications which have NOT CHANGED    Details   buPROPion (WELLBUTRIN XL) 150 MG 24 hr tablet Take 150 mg by mouth daily      CHOLECALCIFEROL PO Take 1 tablet by mouth daily (Strength unknown)      Cyanocobalamin (VITAMIN B-12 PO) Take 1 tablet by mouth daily (Strength unknown)      vitamin C (ASCORBIC ACID) 500 MG tablet Take 500 mg by mouth daily           Allergies   No Known Allergies

## 2023-01-01 NOTE — PLAN OF CARE
Goal Outcome Evaluation:       Summary:  12-31-22, [id number]    A/O x4. VSS on RA. Ind. Reg diet. Denies pain. Rash on face and r arm. Cold applied with effect. MD aware. Slept well. States that she still has a small amount of bloody vaginal drainage that is not her menses. Nausea this a.m., compazine given, pending reassessment

## 2023-01-01 NOTE — PROGRESS NOTES
[facility]  Infectious Disease Progress Note          Assessment and Plan:   IMPRESSION:    1.  Healthy 27-year-old female with acute febrile illness including rigors, leukocytosis, abdominal pain and GI symptoms, imaging without clear cut diagnosis, although left kidney, mild abnormality and left flank discomfort, so possible pyelo, of note no lower urinary tract symptoms and completely normal urinalysis, no positive microbiology so far on broad antibiotics, still clinically feeling poorly.  Diagnosis not entirely clear.     RECOMMENDATIONS:    1On meropenem, no particular likely reason for this type broad coverage, but l continue for now, no urine culture, even done.  Urinalysis was normal.  If it is pyelonephritis may take a while to get better regardless of antibiotic treatment, if she clinically improves, probably attempt empiric oral conversion soon. If she not clinically improving expand workup.  2 Feels better less pain and chills new today vaginal bleeding and not menses time ?? Some of pain was lower and pelvic, CT was neg now thinks maybe urine bleeding?  3 empiric attempt to lev po 7 more days ? OK home        Interval History:   no new complaints better less ill  Vag bleeding 2 weeks early, no fever now micro neg face redness no general rash              Medications:       buPROPion  150 mg Oral Daily     levofloxacin  500 mg Oral Daily                  Physical Exam:   Blood pressure 117/82, pulse 98, temperature 98  F (36.7  C), temperature source Oral, resp. rate 16, weight 70 kg (154 lb 5.2 oz), SpO2 96 %.  Wt Readings from Last 2 Encounters:   01/01/23 70 kg (154 lb 5.2 oz)     Vital Signs with Ranges  Temp:  [98  F (36.7  C)-98.8  F (37.1  C)] 98  F (36.7  C)  Pulse:  [] 98  Resp:  [16] 16  BP: (111-138)/(75-86) 117/82  SpO2:  [96 %-100 %] 96 %    Constitutional: Awake, alert, cooperative, no apparent distress face red   Lungs: Clear to auscultation bilaterally, no  crackles or wheezing   Cardiovascular: Regular rate and rhythm, normal S1 and S2, and no murmur noted   Abdomen: Normal bowel sounds, soft, non-distended, non-tender   Skin: No rashes, no cyanosis, no edema   Other:           Data:   All microbiology laboratory data reviewed.  Recent Labs   Lab Test 01/01/23  0644 12/30/22  0905 12/29/22  0719   WBC 9.6 15.2* 18.2*   HGB 11.1* 11.8 12.1   HCT 31.6* 34.9* 36.6   MCV 90 93 94    206 226     Recent Labs   Lab Test 12/30/22  0905 12/29/22  0719 12/28/22  2136   CR 0.52 0.67 0.66     No lab results found.  No lab results found.    Invalid input(s): UC

## 2023-01-01 NOTE — PLAN OF CARE
Goal Outcome Evaluation:    Patient is A&O x4. Up independent in room, ambulated in hallway few times with nursing. Denies nausea, chest pain or SOB. Complained of headache x1 this morning, managed with tylenol. IV access discontinued. No change with rash on face this afternoon post Levaquin administration. Reviewed AVS with patient and family. No further questions asked. Discharge home with belonging. Discharge medications E-prescribed to [facility] in [place].

## 2023-01-01 NOTE — PROVIDER NOTIFICATION
Paged hospitalist: rashes on patient face is getting worse, more breakdown, lidocaine did not help per patient report.  given benadryl x1. Also, continue having vaginal bleeding per patient report.

## 2023-01-03 LAB
BACTERIA BLD CULT: NO GROWTH
BACTERIA BLD CULT: NO GROWTH

## 2023-01-04 LAB
ATRIAL RATE - MUSE: 110 BPM
DIASTOLIC BLOOD PRESSURE - MUSE: NORMAL MMHG
INTERPRETATION ECG - MUSE: NORMAL
P AXIS - MUSE: 66 DEGREES
PR INTERVAL - MUSE: 164 MS
QRS DURATION - MUSE: 60 MS
QT - MUSE: 306 MS
QTC - MUSE: 414 MS
R AXIS - MUSE: 89 DEGREES
SYSTOLIC BLOOD PRESSURE - MUSE: NORMAL MMHG
T AXIS - MUSE: -1 DEGREES
VENTRICULAR RATE- MUSE: 110 BPM

## 2023-01-30 ENCOUNTER — HEALTH MAINTENANCE LETTER (OUTPATIENT)
Age: 28
End: 2023-01-30

## 2023-07-28 ENCOUNTER — LAB REQUISITION (OUTPATIENT)
Dept: LAB | Facility: CLINIC | Age: 28
End: 2023-07-28
Payer: COMMERCIAL

## 2023-07-28 DIAGNOSIS — N92.6 IRREGULAR MENSTRUATION, UNSPECIFIED: ICD-10-CM

## 2023-07-28 PROCEDURE — 84403 ASSAY OF TOTAL TESTOSTERONE: CPT | Mod: ORL | Performed by: OBSTETRICS & GYNECOLOGY

## 2023-07-28 PROCEDURE — 84146 ASSAY OF PROLACTIN: CPT | Mod: ORL | Performed by: OBSTETRICS & GYNECOLOGY

## 2023-07-29 LAB — PROLACTIN SERPL 3RD IS-MCNC: 21 NG/ML (ref 5–23)

## 2023-08-01 LAB — TESTOST SERPL-MCNC: 25 NG/DL (ref 8–60)

## 2024-01-12 ENCOUNTER — LAB REQUISITION (OUTPATIENT)
Dept: LAB | Facility: CLINIC | Age: 29
End: 2024-01-12

## 2024-01-12 DIAGNOSIS — E78.00 PURE HYPERCHOLESTEROLEMIA, UNSPECIFIED: ICD-10-CM

## 2024-01-12 DIAGNOSIS — Z13.1 ENCOUNTER FOR SCREENING FOR DIABETES MELLITUS: ICD-10-CM

## 2024-01-12 LAB
CHOLEST SERPL-MCNC: 185 MG/DL
FASTING STATUS PATIENT QL REPORTED: YES
FASTING STATUS PATIENT QL REPORTED: YES
GLUCOSE SERPL-MCNC: 83 MG/DL (ref 70–99)
HBA1C MFR BLD: 5.2 %
HDLC SERPL-MCNC: 72 MG/DL
LDLC SERPL CALC-MCNC: 106 MG/DL
NONHDLC SERPL-MCNC: 113 MG/DL
TRIGL SERPL-MCNC: 34 MG/DL

## 2024-01-12 PROCEDURE — 80061 LIPID PANEL: CPT | Performed by: OBSTETRICS & GYNECOLOGY

## 2024-01-12 PROCEDURE — 83036 HEMOGLOBIN GLYCOSYLATED A1C: CPT | Performed by: OBSTETRICS & GYNECOLOGY

## 2024-01-12 PROCEDURE — 82947 ASSAY GLUCOSE BLOOD QUANT: CPT | Performed by: OBSTETRICS & GYNECOLOGY

## 2024-05-11 ENCOUNTER — HEALTH MAINTENANCE LETTER (OUTPATIENT)
Age: 29
End: 2024-05-11

## 2024-09-13 ENCOUNTER — LAB REQUISITION (OUTPATIENT)
Dept: LAB | Facility: CLINIC | Age: 29
End: 2024-09-13

## 2024-09-13 DIAGNOSIS — Z12.4 ENCOUNTER FOR SCREENING FOR MALIGNANT NEOPLASM OF CERVIX: ICD-10-CM

## 2024-09-13 PROCEDURE — G0145 SCR C/V CYTO,THINLAYER,RESCR: HCPCS | Performed by: OBSTETRICS & GYNECOLOGY

## 2024-09-18 LAB
BKR LAB AP GYN ADEQUACY: NORMAL
BKR LAB AP GYN INTERPRETATION: NORMAL
BKR LAB AP HPV REFLEX: NORMAL
BKR LAB AP LMP: NORMAL
BKR LAB AP PREVIOUS ABNL DX: NORMAL
BKR LAB AP PREVIOUS ABNORMAL: NORMAL
PATH REPORT.COMMENTS IMP SPEC: NORMAL
PATH REPORT.COMMENTS IMP SPEC: NORMAL
PATH REPORT.RELEVANT HX SPEC: NORMAL

## 2025-02-19 ENCOUNTER — LAB REQUISITION (OUTPATIENT)
Dept: LAB | Facility: CLINIC | Age: 30
End: 2025-02-19

## 2025-02-19 DIAGNOSIS — Z13.1 ENCOUNTER FOR SCREENING FOR DIABETES MELLITUS: ICD-10-CM

## 2025-02-19 DIAGNOSIS — Z51.81 ENCOUNTER FOR THERAPEUTIC DRUG LEVEL MONITORING: ICD-10-CM

## 2025-02-19 DIAGNOSIS — Z13.220 ENCOUNTER FOR SCREENING FOR LIPOID DISORDERS: ICD-10-CM

## 2025-02-19 DIAGNOSIS — Z13.29 ENCOUNTER FOR SCREENING FOR OTHER SUSPECTED ENDOCRINE DISORDER: ICD-10-CM

## 2025-02-19 LAB
ERYTHROCYTE [DISTWIDTH] IN BLOOD BY AUTOMATED COUNT: 11.7 % (ref 10–15)
EST. AVERAGE GLUCOSE BLD GHB EST-MCNC: 103 MG/DL
HBA1C MFR BLD: 5.2 %
HCT VFR BLD AUTO: 44.3 % (ref 35–47)
HGB BLD-MCNC: 14.1 G/DL (ref 11.7–15.7)
MCH RBC QN AUTO: 31.1 PG (ref 26.5–33)
MCHC RBC AUTO-ENTMCNC: 31.8 G/DL (ref 31.5–36.5)
MCV RBC AUTO: 98 FL (ref 78–100)
PLATELET # BLD AUTO: 308 10E3/UL (ref 150–450)
RBC # BLD AUTO: 4.53 10E6/UL (ref 3.8–5.2)
WBC # BLD AUTO: 7 10E3/UL (ref 4–11)

## 2025-02-19 PROCEDURE — 82607 VITAMIN B-12: CPT | Performed by: OBSTETRICS & GYNECOLOGY

## 2025-02-19 PROCEDURE — 84443 ASSAY THYROID STIM HORMONE: CPT | Performed by: OBSTETRICS & GYNECOLOGY

## 2025-02-19 PROCEDURE — 82565 ASSAY OF CREATININE: CPT | Performed by: OBSTETRICS & GYNECOLOGY

## 2025-02-19 PROCEDURE — 83036 HEMOGLOBIN GLYCOSYLATED A1C: CPT | Performed by: OBSTETRICS & GYNECOLOGY

## 2025-02-19 PROCEDURE — 85041 AUTOMATED RBC COUNT: CPT | Performed by: OBSTETRICS & GYNECOLOGY

## 2025-02-19 PROCEDURE — 80061 LIPID PANEL: CPT | Performed by: OBSTETRICS & GYNECOLOGY

## 2025-02-20 LAB
ALBUMIN SERPL BCG-MCNC: 4.8 G/DL (ref 3.5–5.2)
ALP SERPL-CCNC: 58 U/L (ref 40–150)
ALT SERPL W P-5'-P-CCNC: 16 U/L (ref 0–50)
ANION GAP SERPL CALCULATED.3IONS-SCNC: 12 MMOL/L (ref 7–15)
AST SERPL W P-5'-P-CCNC: 22 U/L (ref 0–45)
BILIRUB SERPL-MCNC: 1.1 MG/DL
BUN SERPL-MCNC: 12.1 MG/DL (ref 6–20)
CALCIUM SERPL-MCNC: 10.1 MG/DL (ref 8.8–10.4)
CHLORIDE SERPL-SCNC: 99 MMOL/L (ref 98–107)
CHOLEST SERPL-MCNC: 195 MG/DL
CREAT SERPL-MCNC: 0.75 MG/DL (ref 0.51–0.95)
EGFRCR SERPLBLD CKD-EPI 2021: >90 ML/MIN/1.73M2
FASTING STATUS PATIENT QL REPORTED: NO
FASTING STATUS PATIENT QL REPORTED: NO
GLUCOSE SERPL-MCNC: 88 MG/DL (ref 70–99)
HCO3 SERPL-SCNC: 25 MMOL/L (ref 22–29)
HDLC SERPL-MCNC: 89 MG/DL
LDLC SERPL CALC-MCNC: 94 MG/DL
NONHDLC SERPL-MCNC: 106 MG/DL
POTASSIUM SERPL-SCNC: 4.6 MMOL/L (ref 3.4–5.3)
PROT SERPL-MCNC: 7.9 G/DL (ref 6.4–8.3)
SODIUM SERPL-SCNC: 136 MMOL/L (ref 135–145)
TRIGL SERPL-MCNC: 62 MG/DL
TSH SERPL DL<=0.005 MIU/L-ACNC: 1.31 UIU/ML (ref 0.3–4.2)
VIT B12 SERPL-MCNC: 796 PG/ML (ref 232–1245)

## 2025-05-17 ENCOUNTER — HEALTH MAINTENANCE LETTER (OUTPATIENT)
Age: 30
End: 2025-05-17